# Patient Record
Sex: MALE | URBAN - METROPOLITAN AREA
[De-identification: names, ages, dates, MRNs, and addresses within clinical notes are randomized per-mention and may not be internally consistent; named-entity substitution may affect disease eponyms.]

---

## 2019-03-26 ENCOUNTER — OFFICE VISIT (OUTPATIENT)
Dept: FAMILY MEDICINE CLINIC | Age: 64
End: 2019-03-26

## 2019-03-26 ENCOUNTER — HOSPITAL ENCOUNTER (OUTPATIENT)
Dept: LAB | Age: 64
Discharge: HOME | End: 2019-03-26
Payer: MEDICAID

## 2019-03-26 VITALS
TEMPERATURE: 96.8 F | WEIGHT: 285 LBS | HEIGHT: 69 IN | SYSTOLIC BLOOD PRESSURE: 162 MMHG | OXYGEN SATURATION: 97 % | HEART RATE: 85 BPM | RESPIRATION RATE: 14 BRPM | BODY MASS INDEX: 42.21 KG/M2 | DIASTOLIC BLOOD PRESSURE: 76 MMHG

## 2019-03-26 DIAGNOSIS — L03.319 CELLULITIS AND ABSCESS OF TRUNK: ICD-10-CM

## 2019-03-26 DIAGNOSIS — Z11.59 NEED FOR HEPATITIS C SCREENING TEST: ICD-10-CM

## 2019-03-26 DIAGNOSIS — I10 ESSENTIAL HYPERTENSION WITH GOAL BLOOD PRESSURE LESS THAN 130/80: ICD-10-CM

## 2019-03-26 DIAGNOSIS — E11.8 TYPE 2 DIABETES MELLITUS WITH COMPLICATION, WITH LONG-TERM CURRENT USE OF INSULIN (HCC): ICD-10-CM

## 2019-03-26 DIAGNOSIS — Z79.4 TYPE 2 DIABETES MELLITUS WITH COMPLICATION, WITH LONG-TERM CURRENT USE OF INSULIN (HCC): Primary | ICD-10-CM

## 2019-03-26 DIAGNOSIS — L02.219 CELLULITIS AND ABSCESS OF TRUNK: ICD-10-CM

## 2019-03-26 DIAGNOSIS — Z79.4 TYPE 2 DIABETES MELLITUS WITH COMPLICATION, WITH LONG-TERM CURRENT USE OF INSULIN (HCC): ICD-10-CM

## 2019-03-26 DIAGNOSIS — E11.8 TYPE 2 DIABETES MELLITUS WITH COMPLICATION, WITH LONG-TERM CURRENT USE OF INSULIN (HCC): Primary | ICD-10-CM

## 2019-03-26 PROBLEM — E66.01 OBESITY, MORBID (HCC): Status: ACTIVE | Noted: 2019-03-26

## 2019-03-26 LAB
ALBUMIN SERPL-MCNC: 3.8 G/DL (ref 3.4–5)
ALBUMIN/GLOB SERPL: 1.1 {RATIO} (ref 0.8–1.7)
ALP SERPL-CCNC: 110 U/L (ref 45–117)
ALT SERPL-CCNC: 24 U/L (ref 16–61)
ANION GAP SERPL CALC-SCNC: 8 MMOL/L (ref 3–18)
AST SERPL-CCNC: 14 U/L (ref 15–37)
BASOPHILS # BLD: 0 K/UL (ref 0–0.1)
BASOPHILS NFR BLD: 0 % (ref 0–2)
BILIRUB SERPL-MCNC: 0.5 MG/DL (ref 0.2–1)
BUN SERPL-MCNC: 17 MG/DL (ref 7–18)
BUN/CREAT SERPL: 15 (ref 12–20)
CALCIUM SERPL-MCNC: 9 MG/DL (ref 8.5–10.1)
CHLORIDE SERPL-SCNC: 107 MMOL/L (ref 100–108)
CHOLEST SERPL-MCNC: 220 MG/DL
CO2 SERPL-SCNC: 27 MMOL/L (ref 21–32)
CREAT SERPL-MCNC: 1.15 MG/DL (ref 0.6–1.3)
DIFFERENTIAL METHOD BLD: ABNORMAL
EOSINOPHIL # BLD: 0.3 K/UL (ref 0–0.4)
EOSINOPHIL NFR BLD: 3 % (ref 0–5)
ERYTHROCYTE [DISTWIDTH] IN BLOOD BY AUTOMATED COUNT: 14.7 % (ref 11.6–14.5)
GLOBULIN SER CALC-MCNC: 3.5 G/DL (ref 2–4)
GLUCOSE SERPL-MCNC: 78 MG/DL (ref 74–99)
HBA1C MFR BLD HPLC: 9 %
HCT VFR BLD AUTO: 40.5 % (ref 36–48)
HDLC SERPL-MCNC: 42 MG/DL (ref 40–60)
HDLC SERPL: 5.2 {RATIO} (ref 0–5)
HGB BLD-MCNC: 12.3 G/DL (ref 13–16)
LDLC SERPL CALC-MCNC: 143.2 MG/DL (ref 0–100)
LIPID PROFILE,FLP: ABNORMAL
LYMPHOCYTES # BLD: 1.7 K/UL (ref 0.9–3.6)
LYMPHOCYTES NFR BLD: 16 % (ref 21–52)
MCH RBC QN AUTO: 27.8 PG (ref 24–34)
MCHC RBC AUTO-ENTMCNC: 30.4 G/DL (ref 31–37)
MCV RBC AUTO: 91.4 FL (ref 74–97)
MONOCYTES # BLD: 0.6 K/UL (ref 0.05–1.2)
MONOCYTES NFR BLD: 6 % (ref 3–10)
NEUTS SEG # BLD: 8.2 K/UL (ref 1.8–8)
NEUTS SEG NFR BLD: 75 % (ref 40–73)
PLATELET # BLD AUTO: 381 K/UL (ref 135–420)
PMV BLD AUTO: 10.8 FL (ref 9.2–11.8)
POTASSIUM SERPL-SCNC: 4.2 MMOL/L (ref 3.5–5.5)
PROT SERPL-MCNC: 7.3 G/DL (ref 6.4–8.2)
RBC # BLD AUTO: 4.43 M/UL (ref 4.7–5.5)
SODIUM SERPL-SCNC: 142 MMOL/L (ref 136–145)
TRIGL SERPL-MCNC: 174 MG/DL (ref ?–150)
VLDLC SERPL CALC-MCNC: 34.8 MG/DL
WBC # BLD AUTO: 10.9 K/UL (ref 4.6–13.2)

## 2019-03-26 PROCEDURE — 80053 COMPREHEN METABOLIC PANEL: CPT

## 2019-03-26 PROCEDURE — 86803 HEPATITIS C AB TEST: CPT

## 2019-03-26 PROCEDURE — 82043 UR ALBUMIN QUANTITATIVE: CPT

## 2019-03-26 PROCEDURE — 85025 COMPLETE CBC W/AUTO DIFF WBC: CPT

## 2019-03-26 PROCEDURE — 36415 COLL VENOUS BLD VENIPUNCTURE: CPT

## 2019-03-26 PROCEDURE — 80061 LIPID PANEL: CPT

## 2019-03-26 RX ORDER — LANCING DEVICE
1 EACH MISCELLANEOUS
COMMUNITY
Start: 2019-02-21

## 2019-03-26 RX ORDER — ACETAMINOPHEN 325 MG/1
650 TABLET ORAL
COMMUNITY
Start: 2019-02-21

## 2019-03-26 RX ORDER — LANCETS 33 GAUGE
EACH MISCELLANEOUS
Refills: 3 | COMMUNITY
Start: 2019-02-22 | End: 2019-10-22 | Stop reason: CLARIF

## 2019-03-26 RX ORDER — METFORMIN HYDROCHLORIDE 1000 MG/1
1000 TABLET ORAL 2 TIMES DAILY
Qty: 60 TAB | Refills: 3 | Status: SHIPPED | OUTPATIENT
Start: 2019-03-26 | End: 2019-07-01 | Stop reason: SDUPTHER

## 2019-03-26 RX ORDER — AMLODIPINE BESYLATE 10 MG/1
10 TABLET ORAL
COMMUNITY
Start: 2019-02-21 | End: 2019-03-26 | Stop reason: SDUPTHER

## 2019-03-26 RX ORDER — SULFAMETHOXAZOLE AND TRIMETHOPRIM 800; 160 MG/1; MG/1
TABLET ORAL
Refills: 0 | COMMUNITY
Start: 2019-02-22 | End: 2019-03-27

## 2019-03-26 RX ORDER — DOCUSATE SODIUM 100 MG/1
100 CAPSULE, LIQUID FILLED ORAL
COMMUNITY
Start: 2019-02-21 | End: 2019-07-01

## 2019-03-26 RX ORDER — AMLODIPINE BESYLATE 10 MG/1
10 TABLET ORAL DAILY
Qty: 90 TAB | Refills: 1 | Status: SHIPPED | OUTPATIENT
Start: 2019-03-26 | End: 2019-04-24 | Stop reason: ALTCHOICE

## 2019-03-26 RX ORDER — INSULIN PUMP SYRINGE, 3 ML
EACH MISCELLANEOUS
COMMUNITY
Start: 2019-02-22 | End: 2020-01-10 | Stop reason: SDUPTHER

## 2019-03-26 RX ORDER — OXYCODONE HYDROCHLORIDE 5 MG/1
5 TABLET ORAL
COMMUNITY
Start: 2019-02-21 | End: 2019-03-27

## 2019-03-26 RX ORDER — INSULIN HUMAN 100 [IU]/ML
INJECTION, SUSPENSION SUBCUTANEOUS
Refills: 2 | COMMUNITY
Start: 2019-03-19 | End: 2019-07-01 | Stop reason: SDUPTHER

## 2019-03-26 RX ORDER — METFORMIN HYDROCHLORIDE 1000 MG/1
1000 TABLET ORAL
COMMUNITY
Start: 2019-02-21 | End: 2019-03-26 | Stop reason: SDUPTHER

## 2019-03-26 NOTE — PATIENT INSTRUCTIONS
High Blood Pressure: Care Instructions  Overview    It's normal for blood pressure to go up and down throughout the day. But if it stays up, you have high blood pressure. Another name for high blood pressure is hypertension. Despite what a lot of people think, high blood pressure usually doesn't cause headaches or make you feel dizzy or lightheaded. It usually has no symptoms. But it does increase your risk of stroke, heart attack, and other problems. You and your doctor will talk about your risks of these problems based on your blood pressure. Your doctor will give you a goal for your blood pressure. Your goal will be based on your health and your age. Lifestyle changes, such as eating healthy and being active, are always important to help lower blood pressure. You might also take medicine to reach your blood pressure goal.  Follow-up care is a key part of your treatment and safety. Be sure to make and go to all appointments, and call your doctor if you are having problems. It's also a good idea to know your test results and keep a list of the medicines you take. How can you care for yourself at home? Medical treatment  · If you stop taking your medicine, your blood pressure will go back up. You may take one or more types of medicine to lower your blood pressure. Be safe with medicines. Take your medicine exactly as prescribed. Call your doctor if you think you are having a problem with your medicine. · Talk to your doctor before you start taking aspirin every day. Aspirin can help certain people lower their risk of a heart attack or stroke. But taking aspirin isn't right for everyone, because it can cause serious bleeding. · See your doctor regularly. You may need to see the doctor more often at first or until your blood pressure comes down. · If you are taking blood pressure medicine, talk to your doctor before you take decongestants or anti-inflammatory medicine, such as ibuprofen.  Some of these medicines can raise blood pressure. · Learn how to check your blood pressure at home. Lifestyle changes  · Stay at a healthy weight. This is especially important if you put on weight around the waist. Losing even 10 pounds can help you lower your blood pressure. · If your doctor recommends it, get more exercise. Walking is a good choice. Bit by bit, increase the amount you walk every day. Try for at least 30 minutes on most days of the week. You also may want to swim, bike, or do other activities. · Avoid or limit alcohol. Talk to your doctor about whether you can drink any alcohol. · Try to limit how much sodium you eat to less than 2,300 milligrams (mg) a day. Your doctor may ask you to try to eat less than 1,500 mg a day. · Eat plenty of fruits (such as bananas and oranges), vegetables, legumes, whole grains, and low-fat dairy products. · Lower the amount of saturated fat in your diet. Saturated fat is found in animal products such as milk, cheese, and meat. Limiting these foods may help you lose weight and also lower your risk for heart disease. · Do not smoke. Smoking increases your risk for heart attack and stroke. If you need help quitting, talk to your doctor about stop-smoking programs and medicines. These can increase your chances of quitting for good. When should you call for help? Call 911 anytime you think you may need emergency care. This may mean having symptoms that suggest that your blood pressure is causing a serious heart or blood vessel problem. Your blood pressure may be over 180/120.   For example, call 911 if:    · You have symptoms of a heart attack. These may include:  ? Chest pain or pressure, or a strange feeling in the chest.  ? Sweating. ? Shortness of breath. ? Nausea or vomiting. ? Pain, pressure, or a strange feeling in the back, neck, jaw, or upper belly or in one or both shoulders or arms. ? Lightheadedness or sudden weakness.   ? A fast or irregular heartbeat.     · You have symptoms of a stroke. These may include:  ? Sudden numbness, tingling, weakness, or loss of movement in your face, arm, or leg, especially on only one side of your body. ? Sudden vision changes. ? Sudden trouble speaking. ? Sudden confusion or trouble understanding simple statements. ? Sudden problems with walking or balance. ? A sudden, severe headache that is different from past headaches.     · You have severe back or belly pain.    Do not wait until your blood pressure comes down on its own. Get help right away.   Call your doctor now or seek immediate care if:    · Your blood pressure is much higher than normal (such as 180/120 or higher), but you don't have symptoms.     · You think high blood pressure is causing symptoms, such as:  ? Severe headache.  ? Blurry vision.    Watch closely for changes in your health, and be sure to contact your doctor if:    · Your blood pressure measures higher than your doctor recommends at least 2 times. That means the top number is higher or the bottom number is higher, or both.     · You think you may be having side effects from your blood pressure medicine. Where can you learn more? Go to http://elma-dillan.info/. Enter Q749 in the search box to learn more about \"High Blood Pressure: Care Instructions. \"  Current as of: July 22, 2018  Content Version: 11.9  © 3150-4661 [employer]. Care instructions adapted under license by [employer] (which disclaims liability or warranty for this information). If you have questions about a medical condition or this instruction, always ask your healthcare professional. [place] [address] any warranty or liability for your use of this information. DASH Diet: Care Instructions  Your Care Instructions    The DASH diet is an eating plan that can help lower your blood pressure. DASH stands for Dietary Approaches to Stop Hypertension.  Hypertension is high blood pressure. The DASH diet focuses on eating foods that are high in calcium, potassium, and magnesium. These nutrients can lower blood pressure. The foods that are highest in these nutrients are fruits, vegetables, low-fat dairy products, nuts, seeds, and legumes. But taking calcium, potassium, and magnesium supplements instead of eating foods that are high in those nutrients does not have the same effect. The DASH diet also includes whole grains, fish, and poultry. The DASH diet is one of several lifestyle changes your doctor may recommend to lower your high blood pressure. Your doctor may also want you to decrease the amount of sodium in your diet. Lowering sodium while following the DASH diet can lower blood pressure even further than just the DASH diet alone. Follow-up care is a key part of your treatment and safety. Be sure to make and go to all appointments, and call your doctor if you are having problems. It's also a good idea to know your test results and keep a list of the medicines you take. How can you care for yourself at home? Following the DASH diet  · Eat 4 to 5 servings of fruit each day. A serving is 1 medium-sized piece of fruit, ½ cup chopped or canned fruit, 1/4 cup dried fruit, or 4 ounces (½ cup) of fruit juice. Choose fruit more often than fruit juice. · Eat 4 to 5 servings of vegetables each day. A serving is 1 cup of lettuce or raw leafy vegetables, ½ cup of chopped or cooked vegetables, or 4 ounces (½ cup) of vegetable juice. Choose vegetables more often than vegetable juice. · Get 2 to 3 servings of low-fat and fat-free dairy each day. A serving is 8 ounces of milk, 1 cup of yogurt, or 1 ½ ounces of cheese. · Eat 6 to 8 servings of grains each day. A serving is 1 slice of bread, 1 ounce of dry cereal, or ½ cup of cooked rice, pasta, or cooked cereal. Try to choose whole-grain products as much as possible. · Limit lean meat, poultry, and fish to 2 servings each day.  A serving is 3 ounces, about the size of a deck of cards. · Eat 4 to 5 servings of nuts, seeds, and legumes (cooked dried beans, lentils, and split peas) each week. A serving is 1/3 cup of nuts, 2 tablespoons of seeds, or ½ cup of cooked beans or peas. · Limit fats and oils to 2 to 3 servings each day. A serving is 1 teaspoon of vegetable oil or 2 tablespoons of salad dressing. · Limit sweets and added sugars to 5 servings or less a week. A serving is 1 tablespoon jelly or jam, ½ cup sorbet, or 1 cup of lemonade. · Eat less than 2,300 milligrams (mg) of sodium a day. If you limit your sodium to 1,500 mg a day, you can lower your blood pressure even more. Tips for success  · Start small. Do not try to make dramatic changes to your diet all at once. You might feel that you are missing out on your favorite foods and then be more likely to not follow the plan. Make small changes, and stick with them. Once those changes become habit, add a few more changes. · Try some of the following:  ? Make it a goal to eat a fruit or vegetable at every meal and at snacks. This will make it easy to get the recommended amount of fruits and vegetables each day. ? Try yogurt topped with fruit and nuts for a snack or healthy dessert. ? Add lettuce, tomato, cucumber, and onion to sandwiches. ? Combine a ready-made pizza crust with low-fat mozzarella cheese and lots of vegetable toppings. Try using tomatoes, squash, spinach, broccoli, carrots, cauliflower, and onions. ? Have a variety of cut-up vegetables with a low-fat dip as an appetizer instead of chips and dip. ? Sprinkle sunflower seeds or chopped almonds over salads. Or try adding chopped walnuts or almonds to cooked vegetables. ? Try some vegetarian meals using beans and peas. Add garbanzo or kidney beans to salads. Make burritos and tacos with mashed yeager beans or black beans. Where can you learn more? Go to http://wills-dillan.info/.   Enter Q519 in the search box to learn more about \"DASH Diet: Care Instructions. \"  Current as of: July 22, 2018  Content Version: 11.9  © 0071-9428 [employer]. Care instructions adapted under license by [employer] (which disclaims liability or warranty for this information). If you have questions about a medical condition or this instruction, always ask your healthcare professional. [address] any warranty or liability for your use of this information.

## 2019-03-26 NOTE — PROGRESS NOTES
[facility]    CC: EOC    HPI:     DMII:  -New diagnosis  -Diagnosed in February  -Has already received dietary counseling  -Does not log blood sugar  -Reports that FBS has been in 150's  -Taking diabetic medication as prescribed  -Denies any side effects or issues with medication  -Not following a regular exercise regimen      HTN:  -Not taking any BP medication  -Was on amlodipine  -Trying to follow low sodium diet  -Does not check BP at home      Abscess:  -Does not know for sure when it started as it was not bothering him  -Located on left side of lower back  -Associated with cellulitis  -Had an I&D by Dr. Paul Herrera with surgery on 2/14/2019 and was started on a 6 day Bactrim DS regimen  -Wound vacuum was placed  -Has followed up with Dr. Paul Herrera on 2/28/2019 and 3/14/2019  -Completed the antibiotic regimen as prescribed  -Next appointment with Dr. Paul Herrera is in two days  -Reports that infection has been improving      ROS: Positive items marked in RED  CON: fever, chills  Cardiovascular: palpitations, CP  Resp: SOB, cough  GI: nausea, vomiting, diarrhea  : dysuria, hematuria  SKIN: rash, itching  MS: arthralgias, myalgias  Neuro: numbness (At night in LEs), weakness  Psych: depression, anxiety  Endo: polyuria (When standing), polydipsia      Past Medical History:   Diagnosis Date    [facility] [address]     Hypertension     Morbid obesity ([facility] [address]        Past Surgical History:   Procedure Laterality Date    HX CYST INCISION AND DRAINAGE  02/14/2019    Abscess       Family History   Problem Relation Age of Onset    Diabetes Mother     Kidney Disease Father        Social History     Socioeconomic History    Marital status: UNKNOWN     Spouse name: Not on file    Number of children: Not on file    Years of education: Not on file    Highest education level: Not on file   Tobacco Use    Smoking status: Never Smoker    Smokeless tobacco: Never Used   Substance and Sexual Activity    Alcohol use: Not Currently    Drug use: Never    Sexual activity: Not Currently       No Known Allergies      Current Outpatient Medications:     acetaminophen (TYLENOL) 325 mg tablet, Take 650 mg by mouth., Disp: , Rfl:     ONETOUCH ULTRA BLUE TEST STRIP strip, TEST BLOOD SUGAR ONCE D, Disp: , Rfl: 3    Blood-Glucose Meter (ONETOUCH ULTRA2) monitoring kit, CHECK BLOOD SUGAR, Disp: , Rfl:     docusate sodium (COLACE) 100 mg capsule, Take 100 mg by mouth., Disp: , Rfl:     HUMULIN 70/30 U-100 INSULIN 100 unit/mL (70-30) injection, INJECT 20 UNITS BENEATH THE SKIN BID, Disp: , Rfl: 2    ONE TOUCH DELICA 33 gauge misc, CHECK BLOOD SUGAR ONCE D, Disp: , Rfl: 3    Lancing Device misc, 1 Each., Disp: , Rfl:     amLODIPine (NORVASC) 10 mg tablet, Take 1 Tab by mouth daily. , Disp: 90 Tab, Rfl: 1    metFORMIN (GLUCOPHAGE) 1,000 mg tablet, Take 1 Tab by mouth two (2) times a day., Disp: 60 Tab, Rfl: 3    Physical Exam:      /76   Pulse 85   Temp 96.8 °F (36 °C) (Oral)   Resp 14   Ht 5' 9\" (1.753 m)   Wt 285 lb (129.3 kg)   SpO2 97%   BMI 42.09 kg/m²     General: obese habitus, NAD, conversant  Eyes: sclera clear bilaterally, no discharge noted, eyelids normal in appearance, EOMI, PERRLA  HENT: NCAT, oropharynx clear, MMM  Neck: supple, no lymphadenopathy  Lungs: CTAB, normal respiratory effort and rate  CV: RRR, no MRGs  ABD: soft, non-tender, non-distended, normal bowel sounds  Ext: 2+ pitting edema noted in LEs bilaterally, no digital cyanosis noted  Skin: Wound vac in place in left side of lower back. No surrounding erythema or calor noned. Stasis dermatitis noted of LEs bilaterally. normal temperature. Psych: alert and oriented to person, place and situation, normal affect  Neuro: speech normal, moving all extremities, gait normal, 5/5 strength in upper/lower extremities bilaterally, sensation and CN II-XII grossly intact    Results for Brianna Rodriguez (MRN <[id number]>):   Ref.  Range 3/26/2019 12:51   Hemoglobin A1c (POC) Latest Units: % 9.0       CBC WITH DIFFERENTIAL AUTO (02/21/2019 6:20 AM EST)   Component Value Ref Range Performed At Pathologist Signature   WBC x 10*3 13.4 (H) 4.0 - 11.0 K/uL [facility]     RBC x 10^6 4.27 3.80 - 5.80 M/uL [facility]     HGB 12.0 (L) 13.1 - 17.2 g/dL [facility]     HCT 37.4 (L) 39.3 - 51.6 % [facility]     MCV 88 80 - 95 fL [facility]     MCH 28 26 - 34 pg [facility]     MCHC 32 31 - 36 g/dL [facility]     RDW 13.0 10.0 - 15.5 % [facility]     Platelet 096 (H) 068 - 440 K/uL [facility]     MPV 9.5 9.0 - 13.0 fL [facility]     Segmented Neutrophils 86 (H) 40 - 75 % [facility]     Lymphocytes 11 (L) 20 - 45 % [facility]     Monocytes 5 3 - 12 % [facility]     Eosinophil 2 0 - 6 % [facility]     Basophils 0 0 - 2 % [facility]     Absolute Neutrophils 11.5 (H) 1.8 - 7.7 K/uL [facility]     Absolute Lymphocytes 1.4 1.0 - 4.8 K/uL [facility]     Absolute Monocyte Count 0.7 0.1 - 1.0 K/uL [facility]     Absolute Eosinophil 0.3 0.0 - 0.5 K/uL [facility]     Absolute Basophil Count 0.0 0.0 - 0.2 K/uL [facility]        Basic Metabolic Panel (BMP) tomorrow am (02/21/2019 6:20 AM EST)   Component Value Ref Range Performed At Pathologist Signature   Potassium 4.4 3.5 - 5.5 mmol/L [facility]     Sodium 140 133 - 145 mmol/L [facility]     Chloride 103 98 - 110 mmol/L [facility]     Glucose 157 (H) 70 - 99 mg/dL [facility]     Calcium 8.8 8.4 - 10.4 mg/dL [facility]     BUN 13 6 - 22 mg/dL [facility]     Creatinine 1.3 0.8 - 1.6 mg/dL [facility]     CO2 25 20 - 32 mmol/L [facility]   eGFR  >60.0 >60.0 [facility]     eGFR Non  53.8 (L) >60.0 [facility]     Anion Gap 12.0 mmol/L [facility]        HGB A1C WITH EST AVG GLUCOSE (02/15/2019 3:58 AM EST)   Component Value Ref Range Performed At Pathologist Signature   Hemoglobin A1c 10.7 (H) 4.8 - 5.9 % [facility]     Estimated Average Glucose 260 (H) 91 - 123 mg/dL [facility]        Assessment/Plan     HTN, Inadequately Controlled:  -Not at goal BP of <130/80  -Counseled on BP goal and recommended lifestyle changes  -Will resume on prior amlodipine regimen  -CBC, CMP, fasting lipid panel, and urine microalbumin/creatinine ordered  -Handouts given on HTN care and DASH diet  -F/U in one month      DMII, Improving:  -HGBA1c has gone down by ~1% (Suspect improvement has been more, as it has not been 3 months since it was last checked)  -Counseled on FBS goal  -Advised to log blood sugar and to bring log to next appointment  -Will continue current diabetic medication regimen  -CBC, CMP, fasting lipid panel, and urine microalbumin/creatinine ordered  -F/U in one month      Abscess with Cellulitis, Resolving:  -No signs of active infection noted  -CBC ordered  -Will defer treatment to surgery  -Follow up as needed      Health Maintenance:  -Hepatitis C AB ordered        Lee Gutiérrez MD  3/26/2019, 12:54 PM

## 2019-03-26 NOTE — PROGRESS NOTES
Chief Complaint   Patient presents with   [address] [place] [address]     1. Have you been to the ER, urgent care clinic since your last visit? Hospitalized since your last visit? No    2. Have you seen or consulted any other health care providers outside of the [address] [place] [address] since your last visit? Yes had abscess removed from his back. 3 most recent PHQ Screens 3/26/2019   Little interest or pleasure in doing things Not at all   Feeling down, depressed, irritable, or hopeless Not at all   Total Score PHQ 2 0     Health Maintenance:  · Patient stated that he has never had a colon cancer screening. · Script needed for Shingrix vaccine. · Hep C screening is due.     Visit Vitals  /76   Pulse 85   Temp 96.8 °F (36 °C) (Oral)   Resp 14   Ht 5' 9\" (1.753 m)   Wt 285 lb (129.3 kg)   SpO2 97%   BMI 42.09 kg/m²

## 2019-03-27 LAB
CREAT UR-MCNC: 84 MG/DL (ref 30–125)
HCV AB SER IA-ACNC: <0.02 INDEX
HCV AB SERPL QL IA: NEGATIVE
HCV COMMENT,HCGAC: NORMAL
MICROALBUMIN UR-MCNC: 4.69 MG/DL (ref 0–3)
MICROALBUMIN/CREAT UR-RTO: 56 MG/G (ref 0–30)

## 2019-04-24 ENCOUNTER — OFFICE VISIT (OUTPATIENT)
Dept: FAMILY MEDICINE CLINIC | Age: 64
End: 2019-04-24

## 2019-04-24 VITALS
WEIGHT: 279 LBS | SYSTOLIC BLOOD PRESSURE: 138 MMHG | BODY MASS INDEX: 41.32 KG/M2 | RESPIRATION RATE: 14 BRPM | HEART RATE: 85 BPM | TEMPERATURE: 96.7 F | OXYGEN SATURATION: 98 % | HEIGHT: 69 IN | DIASTOLIC BLOOD PRESSURE: 59 MMHG

## 2019-04-24 DIAGNOSIS — I10 ESSENTIAL HYPERTENSION WITH GOAL BLOOD PRESSURE LESS THAN 130/80: ICD-10-CM

## 2019-04-24 DIAGNOSIS — R80.9 MICROALBUMINURIA: ICD-10-CM

## 2019-04-24 DIAGNOSIS — Z79.4 TYPE 2 DIABETES MELLITUS WITH COMPLICATION, WITH LONG-TERM CURRENT USE OF INSULIN (HCC): Primary | ICD-10-CM

## 2019-04-24 DIAGNOSIS — E11.8 TYPE 2 DIABETES MELLITUS WITH COMPLICATION, WITH LONG-TERM CURRENT USE OF INSULIN (HCC): Primary | ICD-10-CM

## 2019-04-24 DIAGNOSIS — E78.5 HYPERLIPIDEMIA, UNSPECIFIED HYPERLIPIDEMIA TYPE: ICD-10-CM

## 2019-04-24 RX ORDER — AMLODIPINE BESYLATE AND ATORVASTATIN CALCIUM 10; 40 MG/1; MG/1
1 TABLET, FILM COATED ORAL DAILY
Qty: 30 TAB | Refills: 6 | Status: SHIPPED | OUTPATIENT
Start: 2019-04-24 | End: 2019-07-01 | Stop reason: ALTCHOICE

## 2019-04-24 RX ORDER — LISINOPRIL 10 MG/1
10 TABLET ORAL DAILY
Qty: 30 TAB | Refills: 4 | Status: SHIPPED | OUTPATIENT
Start: 2019-04-24 | End: 2019-07-01 | Stop reason: SDUPTHER

## 2019-04-24 NOTE — PROGRESS NOTES
Chief Complaint   Patient presents with    Follow-up     1 month follow up on HTN and DMII and review of lab results. 1. Have you been to the ER, urgent care clinic since your last visit? Hospitalized since your last visit? No.    2. Have you seen or consulted any other health care providers outside of the [address] [place] [address] since your last visit?  No.      Visit Vitals  /59   Pulse 85   Temp 96.7 °F (35.9 °C) (Oral)   Resp 14   Ht 5' 9\" (1.753 m)   Wt 279 lb (126.6 kg)   SpO2 98%   BMI 41.20 kg/m²

## 2019-04-24 NOTE — PROGRESS NOTES
Kim Tidwell    CC: F/U for chronic disease management    HPI:     DMII:  -Got requested lab work  -Taking medication as prescribed  -Denies any side effects or issues with his medication  -Has been logging blood sugar at home, but forgot to bring log to visit  -Thinks his FBS average has been ~180  -Not exercising, but has been doing some yard work  -Trying to watch what he is eating      HTN:  -Got requested lab work  -Taking BP medication as prescribed  -Denies any side effects or issues with his medication  -Trying to watch what he is eating  -Not following a regular exercise regimen      HLD:  -Got requested lab work  -On no cholesterol lowering medication  -Trying to watch what he is eating  -Not following a regular exercise regimen      ROS: Positive items marked in RED  CON: fever, chills  Cardiovascular: palpitations, CP  Resp: SOB, cough  GI: nausea, vomiting, diarrhea  : dysuria, hematuria      Past Medical History:   Diagnosis Date    [facility]II [facility]di[facility] mellitus, type 2) [facility] [address]     Hypertension     Morbid obesity ([facility] [address]        Past Surgical History:   Procedure Laterality Date    HX CYST INCISION AND DRAINAGE  02/14/2019    Abscess       Family History   Problem Relation Age of Onset    Diabetes Mother     Kidney Disease Father        Social History     Socioeconomic History    Marital status: UNKNOWN     Spouse name: Not on file    Number of children: Not on file    Years of education: Not on file    Highest education level: Not on file   Tobacco Use    Smoking status: Never Smoker    Smokeless tobacco: Never Used   Substance and Sexual Activity    Alcohol use: Not Currently    Drug use: Never    Sexual activity: Not Currently       No Known Allergies      Current Outpatient Medications:     acetaminophen (TYLENOL) 325 mg tablet, Take 650 mg by mouth., Disp: , Rfl:     ONETOUCH ULTRA BLUE TEST STRIP strip, TEST BLOOD SUGAR ONCE D, Disp: , Rfl: 3    Blood-Glucose Meter (ONETOUCH ULTRA2) monitoring kit, CHECK BLOOD SUGAR, Disp: , Rfl:     docusate sodium (COLACE) 100 mg capsule, Take 100 mg by mouth., Disp: , Rfl:     HUMULIN 70/30 U-100 INSULIN 100 unit/mL (70-30) injection, INJECT 20 UNITS BENEATH THE SKIN BID, Disp: , Rfl: 2    ONE TOUCH DELICA 33 gauge misc, CHECK BLOOD SUGAR ONCE D, Disp: , Rfl: 3    Lancing Device misc, 1 Each., Disp: , Rfl:     amLODIPine (NORVASC) 10 mg tablet, Take 1 Tab by mouth daily. , Disp: 90 Tab, Rfl: 1    metFORMIN (GLUCOPHAGE) 1,000 mg tablet, Take 1 Tab by mouth two (2) times a day., Disp: 60 Tab, Rfl: 3    acyclovir (ZOVIRAX) 800 mg tablet, Take 800 mg by mouth two (2) times a day., Disp: , Rfl:     white petrolatum-mineral oil (LACRILUBE S.O.P.) 56.8-42.5 % ointment, Administer  to both eyes every twelve (12) hours. , Disp: , Rfl:     predniSONE (DELTASONE) 10 mg tablet, Take  by mouth daily (with breakfast). , Disp: , Rfl:     Physical Exam:      /59   Pulse 85   Temp 96.7 °F (35.9 °C) (Oral)   Resp 14   Ht 5' 9\" (1.753 m)   Wt 279 lb (126.6 kg)   SpO2 98%   BMI 41.20 kg/m²     General: obese habitus, NAD, conversant  Eyes: sclera clear bilaterally, no discharge noted, eyelids normal in appearance  HENT: NCAT  Lungs: CTAB, normal respiratory effort and rate  CV: RRR, no MRGs  ABD: soft, non-tender, non-distended, normal bowel sounds  Skin: normal temperature, turgor, color, and texture  Psych: alert and oriented to person, place and situation, normal affect  Neuro: speech normal, moving all extremities, gait normal      Results for Ruddy Boxer (MRN [id number]):   Ref.  Range 3/26/2019 13:34   WBC Latest Ref Range: 4.6 - 13.2 K/uL 10.9   RBC Latest Ref Range: 4.70 - 5.50 M/uL 4.43 (L)   HGB Latest Ref Range: 13.0 - 16.0 g/dL 12.3 (L)   HCT Latest Ref Range: 36.0 - 48.0 % 40.5   MCV Latest Ref Range: 74.0 - 97.0 FL 91.4   MCH Latest Ref Range: 24.0 - 34.0 PG 27.8   MCHC Latest Ref Range: 31.0 - 37.0 g/dL 30.4 (L)   RDW Latest Ref Range: 11.6 - 14.5 % 14.7 (H)   PLATELET Latest Ref Range: 135 - 420 K/uL 381   MPV Latest Ref Range: 9.2 - 11.8 FL 10.8   NEUTROPHILS Latest Ref Range: 40 - 73 % 75 (H)   LYMPHOCYTES Latest Ref Range: 21 - 52 % 16 (L)   MONOCYTES Latest Ref Range: 3 - 10 % 6   EOSINOPHILS Latest Ref Range: 0 - 5 % 3   BASOPHILS Latest Ref Range: 0 - 2 % 0   DF Latest Units:   AUTOMATED   ABS. NEUTROPHILS Latest Ref Range: 1.8 - 8.0 K/UL 8.2 (H)   ABS. LYMPHOCYTES Latest Ref Range: 0.9 - 3.6 K/UL 1.7   ABS. MONOCYTES Latest Ref Range: 0.05 - 1.2 K/UL 0.6   ABS. EOSINOPHILS Latest Ref Range: 0.0 - 0.4 K/UL 0.3   ABS. BASOPHILS Latest Ref Range: 0.0 - 0.1 K/UL 0.0   Sodium Latest Ref Range: 136 - 145 mmol/L 142   Potassium Latest Ref Range: 3.5 - 5.5 mmol/L 4.2   Chloride Latest Ref Range: 100 - 108 mmol/L 107   CO2 Latest Ref Range: 21 - 32 mmol/L 27   Anion gap Latest Ref Range: 3.0 - 18 mmol/L 8   Glucose Latest Ref Range: 74 - 99 mg/dL 78   BUN Latest Ref Range: 7.0 - 18 MG/DL 17   Creatinine Latest Ref Range: 0.6 - 1.3 MG/DL 1.15   BUN/Creatinine ratio Latest Ref Range: 12 - 20   15   Calcium Latest Ref Range: 8.5 - 10.1 MG/DL 9.0   GFR est non-AA Latest Ref Range: >60 ml/min/1.73m2 >60   GFR est AA Latest Ref Range: >60 ml/min/1.73m2 >60   Bilirubin, total Latest Ref Range: 0.2 - 1.0 MG/DL 0.5   Protein, total Latest Ref Range: 6.4 - 8.2 g/dL 7.3   Albumin Latest Ref Range: 3.4 - 5.0 g/dL 3.8   Globulin Latest Ref Range: 2.0 - 4.0 g/dL 3.5   A-G Ratio Latest Ref Range: 0.8 - 1.7   1.1   ALT (SGPT) Latest Ref Range: 16 - 61 U/L 24   AST Latest Ref Range: 15 - 37 U/L 14 (L)   Alk.  phosphatase Latest Ref Range: 45 - 117 U/L 110   Triglyceride Latest Ref Range: <150 MG/ (H)   Cholesterol, total Latest Ref Range: <200 MG/ (H)   HDL Cholesterol Latest Ref Range: 40 - 60 MG/DL 42   CHOL/HDL Ratio Latest Ref Range: 0 - 5.0   5.2 (H)   LDL, calculated Latest Ref Range: 0 - 100 MG/.2 (H)   VLDL, calculated Latest Units: MG/DL 34.8   Creatinine, urine Latest Ref Range: 30 - 125 mg/dL 84.00   Microalbumin,urine random Latest Ref Range: 0 - 3.0 MG/DL 4.69 (H)   Microalbumin/Creat. Ratio Latest Ref Range: 0 - 30 mg/g 56 (H)   Hepatitis C virus Ab Latest Ref Range: <0.80 Index <0.02   Hep C  virus Ab Interp.  Latest Ref Range: NEG   NEGATIVE   Hep C  virus Ab comment Latest Units:   Pend       Assessment/Plan     DMII:  -Advised to bring blood sugar log to next appointment as previously discussed  -Will continue current diabetic medication regimen  -Plan for HGBA1c, diabetic foot exam, and urine microalbumin/creatinine at next visit  -Follow up in 2 months      HTN, Inadequately Controlled:  -10 year ASCVD risk of 30.6%  -Not at goal BP of <130/80  -Will start on lisinopril regimen  -Plan for repeat urine microalbumin/creatinine at next visit  -Follow up in 2 months      HLD, Inadequately Controlled:  -10 year ASCVD risk of 30.6%  -Counseled on ASCVD risk and treatment recommendations  -Will start on statin therapy, per shared decision making  -Handout given on HLD care  -F/U in 2 months      Microalbinuria:  -Patient counseled on diagnosis  -Started on lisinopril regimen  -Plan to repeat urine microalbumin/creatinine at next visit  -Handout given on albumin urine test and albumin-creatinine ratio  -F/U in 2 months        Singh Moreno MD  4/24/2019, 2:31 PM

## 2019-04-24 NOTE — PATIENT INSTRUCTIONS
Albumin Urine Test: About This Test  What is it? An albumin urine test checks urine for a protein called albumin. This protein is normally found in the blood. When the kidneys are damaged, small amounts of albumin leak into the urine. This is called albuminuria. If the amount of albumin is very small, but still abnormal, it is called microalbuminuria. You might provide a urine sample for your doctor during a visit. Your doctor might also ask you for a one-time sample at home or over a specific period of time, such as over 4 hours or 24 hours. Your doctor will tell you what to do. Why is this test done? This test is done to check for albumin in the urine. It helps tell your doctor how well your kidneys are working. This test is done most often to check the kidneys in people with diabetes. Other conditions also cause albuminuria. These conditions include high blood pressure, heart failure, and cirrhosis. The sooner your doctor knows you have kidney damage, the more your doctor can do to protect your kidneys. How can you prepare for the test?  · Do not exercise just before the test.  · Tell your doctor if you are having your period or have vaginal discharge. · Tell your doctor about all the nonprescription and prescription medicines and herbs or other supplements you take. Some of these can affect the results of this test.  What happens before the test?  · Your doctor or the lab likely will give you the container you need to hold the urine. You will get instructions on when and how to collect the urine. This might be a one-time sample or a number of samples over a period of time. What happens during the test?  One-time urine collection  · Wash your hands before you start. · If the collection cup you are given has a lid, remove it carefully. Set it down with the inner surface up. Do not touch the inside of the cup with your fingers. · Clean the area around your genitals.   ? For men: Pull back the foreskin, if present, and clean the head of the penis with medicated towelettes or swabs. ? For women: Spread open the genital folds of skin with one hand. Then use medicated towelettes or swabs in your other hand to clean the area where urine comes out (the urethra). Wipe the area from front to back. · Start urinating into the toilet or urinal. A woman should hold apart the genital folds of skin while she urinates. · After the urine has flowed for several seconds, place the cup into the urine stream. Collect about 2 fluid ounces of this \"midstream\" urine without stopping your flow of urine. · Don't touch the rim of the cup to your genital area. Don't get toilet paper, pubic hair, stool (feces), menstrual blood, or anything else in the urine sample. · Finish urinating into the toilet or urinal.  · Carefully replace and tighten the lid on the cup, and then return it to the lab. If you are collecting the urine at home and can't get it to the lab in an hour, refrigerate it. Urine collection over time  You collect your urine for a period of time, such as over 4 or 24 hours. · You start collecting your urine in the morning. When you first get up, empty your bladder. But do not save this urine. Write down the time that you began. · For the set period of time, collect all your urine. Urinate into a small, clean container. Then pour the urine into the large container. Don't touch the inside of the container with your fingers. · Keep the collected urine in the refrigerator for the collection time. Empty your bladder for the last time at or just before the end of the collection period. Add this urine to the large container. Then write down the time. What else should you know about the test?  · If your results are higher than normal, your doctor may check your urine more often to watch for kidney damage. · If your test shows that you may have kidney damage, you may get other tests.   What happens after the test?  · Follow your doctor's instructions for taking the urine to the doctor's office or lab. · You can go back to your usual activities right away. When should you call for help? Watch closely for changes in your health, and be sure to contact your doctor if you have any problems. Follow-up care is a key part of your treatment and safety. Be sure to make and go to all appointments, and call your doctor if you are having problems. It's also a good idea to keep a list of the medicines you take. Ask your doctor when you can expect to have your test results. Where can you learn more? Go to http://elma-dillan.info/. Enter L729 in the search box to learn more about \"Albumin Urine Test: About This Test.\"  Current as of: March 14, 2018  Content Version: 11.9  © 5038-7944 [employer]. Care instructions adapted under license by [employer] (which disclaims liability or warranty for this information). If you have questions about a medical condition or this instruction, always ask your healthcare professional. [address] any warranty or liability for your use of this information. Albumin-Creatinine Ratio: About This Test  What is it? An albumin-creatinine ratio test compares the amounts of albumin and creatinine in your urine. Albumin (say \"al-BYOO-mio\") is normally found in the blood. When the kidneys are damaged, small amounts of albumin (microalbumin) leak into the urine. Creatinine (say \"ekhv-YX-ff-neen\") is a waste product found in urine. Why is this test done? This test helps your doctor see how well your kidneys are working. It is done most often to check the kidneys in people with diabetes. It may also be done to check people with high blood pressure, heart failure, and cirrhosis.   How can you prepare for the test?    · Do not exercise just before the test.     · Tell your doctor if you are having your period or have vaginal discharge. · Tell your doctors ALL the medicines, vitamins, supplements, and herbal remedies you take. Some of these can affect the results of this test.   What happens before the test?  · Your doctor or the lab will probably give you the container you need to hold the urine and give you instructions on when and how to collect the urine. This might be a one-time collection or a collection over a period of time. What happens during the test?  One-time urine collection  · Wash your hands. · If the collection cup has a lid, remove it carefully and set it down with the inner surface up. Don't touch the inside of the cup. · Clean the area around your genitals. ? For men: Retract the foreskin, if present, and clean the head of your penis with medicated towelettes or swabs. ? For women: Spread open the genital folds of skin with one hand. Then use your other hand to clean the area around the urethra with medicated towelettes or swabs. Wipe the area from front to back so bacteria from the anus are not wiped across the urethra. · Begin urinating into the toilet or urinal. If you are a woman, hold apart the genital folds of skin while you urinate. · After the urine has flowed for several seconds, place the collection cup into the urine stream and collect about 2 fluid ounces of this \"midstream\" urine without stopping your flow of urine. · Don't touch the rim of the cup to your genital area. Don't get toilet paper, pubic hair, stool (feces), menstrual blood, or anything else in the urine sample. · Finish urinating into the toilet or urinal.  · Carefully replace and tighten the lid on the cup, and then return it to the lab. If you are collecting the urine at home and can't get it to the lab in an hour, refrigerate it. Urine collection over time  You collect your urine for a period of time, such as over 4 or 24 hours. · Start collecting your urine in the morning.  When you first get up, empty your bladder, but don't save this urine. Write down the time that you urinated to kyree the beginning of your collection period. · For the set period of time, collect all your urine. Urinate into a small, clean container as noted in the instructions above, and then pour the urine into the large container. Don't touch the inside of this container with your fingers. Keep the large container in the refrigerator. · Empty your bladder for the final time at or just before the end of the collection period. Add this urine to the large container, and write down the time. What else should you know about the test?  · Your results will include an explanation of what a \"normal\" result is. This is called a \"reference range. \" It is just a guide. Your doctor will evaluate your results based on your health and other factors. This means that a value that falls outside the normal values listed may still be normal for you. · If your results are higher than normal, your doctor may check your urine more often to watch for kidney damage. · If your test shows that you may have kidney damage, other tests may be done. What happens after the test?  · Follow your doctor's instructions for taking the urine to the doctor's office or lab. · You can go back to your usual activities right away. Follow-up care is a key part of your treatment and safety. Be sure to make and go to all appointments, and call your doctor if you are having problems. It's also a good idea to keep a list of the medicines you take. Ask your doctor when you can expect to have your test results. Where can you learn more? Go to http://elma-dillan.info/. Enter N746 in the search box to learn more about \"Albumin-Creatinine Ratio: About This Test.\"  Current as of: March 14, 2018  Content Version: 11.9  © 9147-4162 [employer]. Care instructions adapted under license by [employer] (which disclaims liability or warranty for this information).  If you have questions about a medical condition or this instruction, always ask your healthcare professional. [address] any warranty or liability for your use of this information. Learning About High Cholesterol  What is high cholesterol? Cholesterol is a type of fat in your blood. It is needed for many body functions, such as making new cells. Cholesterol is made by your body. It also comes from food you eat. If you have too much cholesterol, it starts to build up in your arteries. This is called hardening of the arteries, or atherosclerosis. High cholesterol raises your risk of a heart attack and stroke. There are different types of cholesterol. LDL is the \"bad\" cholesterol. High LDL can raise your risk for heart disease, heart attack, and stroke. HDL is the \"good\" cholesterol. High HDL is linked with a lower risk for heart disease, heart attack, and stroke. Your cholesterol levels help your doctor find out your risk for having a heart attack or stroke. How can you prevent high cholesterol? A heart-healthy lifestyle can help you prevent high cholesterol. This lifestyle helps lower your risk for a heart attack and stroke. · Eat heart-healthy foods. ? Eat fruits, vegetables, whole grains (like oatmeal), dried beans and peas, nuts and seeds, soy products (like tofu), and fat-free or low-fat dairy products. ? Replace butter, margarine, and hydrogenated or partially hydrogenated oils with olive and canola oils. (Canola oil margarine without trans fat is fine.)  ? Replace red meat with fish, poultry, and soy protein (like tofu). ? Limit processed and packaged foods like chips, crackers, and cookies. · Be active. Exercise can improve your cholesterol level. Get at least 30 minutes of exercise on most days of the week. Walking is a good choice. You also may want to do other activities, such as running, swimming, cycling, or playing tennis or team sports. · Stay at a healthy weight. Lose weight if you need to. · Don't smoke. If you need help quitting, talk to your doctor about stop-smoking programs and medicines. These can increase your chances of quitting for good. How is high cholesterol treated? The goal of treatment is to reduce your chances of having a heart attack or stroke. The goal is not to lower your cholesterol numbers only. · You may make lifestyle changes, such as eating healthy foods, not smoking, losing weight, and being more active. · You may have to take medicine. Follow-up care is a key part of your treatment and safety. Be sure to make and go to all appointments, and call your doctor if you are having problems. It's also a good idea to know your test results and keep a list of the medicines you take. Where can you learn more? Go to http://elma-dillan.info/. Enter O657 in the search box to learn more about \"Learning About High Cholesterol. \"  Current as of: July 22, 2018  Content Version: 11.9  © 7104-9890 [employer]. Care instructions adapted under license by [employer] (which disclaims liability or warranty for this information). If you have questions about a medical condition or this instruction, always ask your healthcare professional. [address] any warranty or liability for your use of this information.

## 2019-07-01 ENCOUNTER — OFFICE VISIT (OUTPATIENT)
Dept: FAMILY MEDICINE CLINIC | Age: 64
End: 2019-07-01

## 2019-07-01 VITALS
BODY MASS INDEX: 42.06 KG/M2 | TEMPERATURE: 97.9 F | RESPIRATION RATE: 17 BRPM | WEIGHT: 284 LBS | HEART RATE: 79 BPM | SYSTOLIC BLOOD PRESSURE: 171 MMHG | OXYGEN SATURATION: 95 % | DIASTOLIC BLOOD PRESSURE: 88 MMHG | HEIGHT: 69 IN

## 2019-07-01 DIAGNOSIS — E11.8 TYPE 2 DIABETES MELLITUS WITH COMPLICATION, WITH LONG-TERM CURRENT USE OF INSULIN (HCC): Primary | ICD-10-CM

## 2019-07-01 DIAGNOSIS — E78.5 HYPERLIPIDEMIA, UNSPECIFIED HYPERLIPIDEMIA TYPE: ICD-10-CM

## 2019-07-01 DIAGNOSIS — R80.9 MICROALBUMINURIA: ICD-10-CM

## 2019-07-01 DIAGNOSIS — I10 ESSENTIAL HYPERTENSION WITH GOAL BLOOD PRESSURE LESS THAN 130/80: ICD-10-CM

## 2019-07-01 DIAGNOSIS — Z79.4 TYPE 2 DIABETES MELLITUS WITH COMPLICATION, WITH LONG-TERM CURRENT USE OF INSULIN (HCC): Primary | ICD-10-CM

## 2019-07-01 LAB — HBA1C MFR BLD HPLC: 8.9 %

## 2019-07-01 RX ORDER — LISINOPRIL 10 MG/1
10 TABLET ORAL DAILY
Qty: 30 TAB | Refills: 4 | Status: SHIPPED | OUTPATIENT
Start: 2019-07-01 | End: 2019-09-18 | Stop reason: DRUGHIGH

## 2019-07-01 RX ORDER — INSULIN HUMAN 100 [IU]/ML
INJECTION, SUSPENSION SUBCUTANEOUS
Qty: 10 ML | Refills: 2 | Status: SHIPPED | OUTPATIENT
Start: 2019-07-01 | End: 2019-10-22 | Stop reason: SDUPTHER

## 2019-07-01 RX ORDER — AMLODIPINE BESYLATE 10 MG/1
10 TABLET ORAL DAILY
Qty: 90 TAB | Refills: 1 | Status: SHIPPED | OUTPATIENT
Start: 2019-07-01 | End: 2020-03-06

## 2019-07-01 RX ORDER — ATORVASTATIN CALCIUM 40 MG/1
40 TABLET, FILM COATED ORAL DAILY
Qty: 90 TAB | Refills: 1 | Status: SHIPPED | OUTPATIENT
Start: 2019-07-01 | End: 2020-03-06

## 2019-07-01 RX ORDER — METFORMIN HYDROCHLORIDE 1000 MG/1
1000 TABLET ORAL 2 TIMES DAILY
Qty: 180 TAB | Refills: 3 | Status: SHIPPED | OUTPATIENT
Start: 2019-07-01 | End: 2020-07-02

## 2019-07-01 RX ORDER — INSULIN HUMAN 100 [IU]/ML
INJECTION, SUSPENSION SUBCUTANEOUS
Qty: 10 ML | Refills: 2 | Status: SHIPPED | OUTPATIENT
Start: 2019-07-01 | End: 2019-07-01 | Stop reason: SDUPTHER

## 2019-07-01 NOTE — PATIENT INSTRUCTIONS
Diabetes Foot Health: Care Instructions  Your Care Instructions    When you have diabetes, your feet need extra care and attention. Diabetes can damage the nerve endings and blood vessels in your feet, making you less likely to notice when your feet are injured. Diabetes also limits your body's ability to fight infection and get blood to areas that need it. If you get a minor foot injury, it could become an ulcer or a serious infection. With good foot care, you can prevent most of these problems. Caring for your feet can be quick and easy. Most of the care can be done when you are bathing or getting ready for bed. Follow-up care is a key part of your treatment and safety. Be sure to make and go to all appointments, and call your doctor if you are having problems. It's also a good idea to know your test results and keep a list of the medicines you take. How can you care for yourself at home? · Keep your blood sugar close to normal by watching what and how much you eat, monitoring blood sugar, taking medicines if prescribed, and getting regular exercise. · Do not smoke. Smoking affects blood flow and can make foot problems worse. If you need help quitting, talk to your doctor about stop-smoking programs and medicines. These can increase your chances of quitting for good. · Eat a diet that is low in fats. High fat intake can cause fat to build up in your blood vessels and decrease blood flow. · Inspect your feet daily for blisters, cuts, cracks, or sores. If you cannot see well, use a mirror or have someone help you. · Take care of your feet:  ? Wash your feet every day. Use warm (not hot) water. Check the water temperature with your wrists or other part of your body, not your feet. ? Dry your feet well. Pat them dry. Do not rub the skin on your feet too hard. Dry well between your toes. If the skin on your feet stays moist, bacteria or a fungus can grow, which can lead to infection. ?  Keep your skin soft. Use moisturizing skin cream to keep the skin on your feet soft and prevent calluses and cracks. But do not put the cream between your toes, and stop using any cream that causes a rash. ? Clean underneath your toenails carefully. Do not use a sharp object to clean underneath your toenails. Use the blunt end of a nail file or other rounded tool. ? Trim and file your toenails straight across to prevent ingrown toenails. Use a nail clipper, not scissors. Use an emery board to smooth the edges. · Change socks daily. Socks without seams are best, because seams often rub the feet. You can find socks for people with diabetes from specialty catalogs. · Look inside your shoes every day for things like gravel or torn linings, which could cause blisters or sores. · Buy shoes that fit well:  ? Look for shoes that have plenty of space around the toes. This helps prevent bunions and blisters. ? Try on shoes while wearing the kind of socks you will usually wear with the shoes. ? Avoid plastic shoes. They may rub your feet and cause blisters. Good shoes should be made of materials that are flexible and breathable, such as leather or cloth. ? Break in new shoes slowly by wearing them for no more than an hour a day for several days. Take extra time to check your feet for red areas, blisters, or other problems after you wear new shoes. · Do not go barefoot. Do not wear sandals, and do not wear shoes with very thin soles. Thin soles are easy to puncture. They also do not protect your feet from hot pavement or cold weather. · Have your doctor check your feet during each visit. If you have a foot problem, see your doctor. Do not try to treat an early foot problem at home. Home remedies or treatments that you can buy without a prescription (such as corn removers) can be harmful. · Always get early treatment for foot problems. A minor irritation can lead to a major problem if not properly cared for early.   When should you call for help? Call your doctor now or seek immediate medical care if:    · You have a foot sore, an ulcer or break in the skin that is not healing after 4 days, bleeding corns or calluses, or an ingrown toenail.     · You have blue or black areas, which can mean bruising or blood flow problems.     · You have peeling skin or tiny blisters between your toes or cracking or oozing of the skin.     · You have a fever for more than 24 hours and a foot sore.     · You have new numbness or tingling in your feet that does not go away after you move your feet or change positions.     · You have unexplained or unusual swelling of the foot or ankle.    Watch closely for changes in your health, and be sure to contact your doctor if:    · You cannot do proper foot care. Where can you learn more? Go to http://elma-dillan.info/. Enter A739 in the search box to learn more about \"Diabetes Foot Health: Care Instructions. \"  Current as of: July 25, 2018  Content Version: 11.9  © 3182-8085 [employer]. Care instructions adapted under license by [employer] (which disclaims liability or warranty for this information). If you have questions about a medical condition or this instruction, always ask your healthcare professional. [address] any warranty or liability for your use of this information.

## 2019-07-01 NOTE — PROGRESS NOTES
Chief Complaint   Patient presents with    Follow Up Chronic Condition     3 month; check A1C and Foot Check     1. Have you been to the ER, urgent care clinic since your last visit? Hospitalized since your last visit? No    2. Have you seen or consulted any other health care providers outside of the [address] [place] [address] since your last visit? Include any pap smears or colon screening.  No

## 2019-07-01 NOTE — PROGRESS NOTES
[facility]    CC: F/U for chronic disease management    HPI:     DMII:  -Taking medication as prescribed  -Denies any side effects or issues with his medication  -Has been checking blood sugar at home, but forgot to bring log to visit  -Reports that his FBS has not been at goal of <130  -Not following a regular exercise regimen        HTN:  -Not taking amlodipine, as Caduet was not covered  -Denies any side effects from his BP medication  -Does not check his BP at home  -Not following a regular exercise regimen        HLD:  -Not taking prescribed statin, as Caduet was not covered  -Not following a regular exercise regimen       ROS: Positive items marked in RED  CON: fever, chills  Cardiovascular: palpitations, CP  Resp: SOB, cough  GI: nausea, vomiting, diarrhea  : dysuria, hematuria      Past Medical History:   Diagnosis Date    [facility]     Hypertension     Morbid obesity ([facility]        Past Surgical History:   Procedure Laterality Date    HX CYST INCISION AND DRAINAGE  02/14/2019    Abscess       Family History   Problem Relation Age of Onset    Diabetes Mother     Kidney Disease Father        Social History     Socioeconomic History    Marital status: UNKNOWN     Spouse name: Not on file    Number of children: Not on file    Years of education: Not on file    Highest education level: Not on file   Tobacco Use    Smoking status: Never Smoker    Smokeless tobacco: Never Used   Substance and Sexual Activity    Alcohol use: Not Currently    Drug use: Never    Sexual activity: Not Currently       No Known Allergies      Current Outpatient Medications:     amLODIPine-atorvastatin (CADUET) 10-40 mg per tablet, Take 1 Tab by mouth daily. , Disp: 30 Tab, Rfl: 6    lisinopril (PRINIVIL, ZESTRIL) 10 mg tablet, Take 1 Tab by mouth daily.  Indications: high blood pressure, Disp: 30 Tab, Rfl: 4    acetaminophen (TYLENOL) 325 mg tablet, Take 650 mg by mouth., Disp: , Rfl:     ONETOUCH ULTRA BLUE TEST STRIP strip, TEST BLOOD SUGAR ONCE D, Disp: , Rfl: 3    Blood-Glucose Meter (ONETOUCH ULTRA2) monitoring kit, CHECK BLOOD SUGAR, Disp: , Rfl:     docusate sodium (COLACE) 100 mg capsule, Take 100 mg by mouth., Disp: , Rfl:     HUMULIN 70/30 U-100 INSULIN 100 unit/mL (70-30) injection, INJECT 20 UNITS BENEATH THE SKIN BID, Disp: , Rfl: 2    ONE TOUCH DELICA 33 gauge misc, CHECK BLOOD SUGAR ONCE D, Disp: , Rfl: 3    Lancing Device misc, 1 Each., Disp: , Rfl:     metFORMIN (GLUCOPHAGE) 1,000 mg tablet, Take 1 Tab by mouth two (2) times a day., Disp: 60 Tab, Rfl: 3    acyclovir (ZOVIRAX) 800 mg tablet, Take 800 mg by mouth two (2) times a day., Disp: , Rfl:     white petrolatum-mineral oil (LACRILUBE S.O.P.) 56.8-42.5 % ointment, Administer  to both eyes every twelve (12) hours. , Disp: , Rfl:     predniSONE (DELTASONE) 10 mg tablet, Take  by mouth daily (with breakfast). , Disp: , Rfl:     Physical Exam:      /88   Pulse 79   Temp 97.9 °F (36.6 °C) (Oral)   Resp 17   Ht 5' 9\" (1.753 m)   Wt 284 lb (128.8 kg)   SpO2 95%   BMI 41.94 kg/m²     General: obese habitus, NAD, conversant  Eyes: sclera clear bilaterally, no discharge noted, eyelids normal in appearance  HENT: NCAT  Lungs: CTAB, normal respiratory effort and rate  CV: RRR, no MRGs  ABD: soft, non-tender, non-distended, normal bowel sounds  Psych: alert and oriented to person, place and situation, normal affect  Neuro: speech normal, moving all extremities, gait normal    Diabetic foot exam:     Left Foot:   Visual Exam: normal    Pulse DP: 1+ (weak)   Filament test: reduced sensation    Vibratory sensation: diminished   Proprioception: abnormal      Right Foot:   Visual Exam: normal    Pulse DP: 1+ (weak)   Filament test: reduced sensation    Vibratory sensation: diminished    Proprioception: normal    Results for Tejal Cheek (MRN [id number]):   Ref.  Range 7/1/2019 15:19   Hemoglobin A1c (POC) Latest Units: % 8.9       Assessment/Plan     DMII, Inadequately Controlled:  -HGBA1c not at goal of <7%  -Insulin increased to 22 units in AM and 23 units in PM  -Advised to bring blood sugar log to next appointment as previously discussed  -Referred to podiatry, given abnormal foot exam  -Plan for urine microalbumin/creatinine at next visit  -F/U in 1 month        HTN, Inadequately Controlled:  -10 year ASCVD risk of 30.6%  -Not at goal BP of <130/80  -Caduet discontinued  -Will start on separate amlodipine regimen  -Plan for repeat urine microalbumin/creatinine at next visit  -F/U in 1 month        HLD, Inadequately Controlled:  -10 year ASCVD risk of 30.6%  -Caduet discontinued  -Will start on separate atorvastatin regimen  -F/U in 1 month         Barrett Glover MD  7/1/2019, 3:01 PM

## 2019-07-08 PROBLEM — E78.5 HYPERLIPIDEMIA: Status: ACTIVE | Noted: 2019-07-08

## 2019-07-15 RX ORDER — CALCIUM CARB/VITAMIN D3/VIT K1 500-100-40
TABLET,CHEWABLE ORAL
COMMUNITY
Start: 2019-02-22 | End: 2019-07-15 | Stop reason: SDUPTHER

## 2019-07-19 RX ORDER — CALCIUM CARB/VITAMIN D3/VIT K1 500-100-40
TABLET,CHEWABLE ORAL
Qty: 200 SYRINGE | Refills: 1 | Status: SHIPPED | OUTPATIENT
Start: 2019-07-19

## 2019-09-18 ENCOUNTER — OFFICE VISIT (OUTPATIENT)
Dept: FAMILY MEDICINE CLINIC | Age: 64
End: 2019-09-18

## 2019-09-18 VITALS
DIASTOLIC BLOOD PRESSURE: 62 MMHG | OXYGEN SATURATION: 99 % | SYSTOLIC BLOOD PRESSURE: 142 MMHG | WEIGHT: 284 LBS | RESPIRATION RATE: 14 BRPM | TEMPERATURE: 96.6 F | HEART RATE: 69 BPM | BODY MASS INDEX: 42.06 KG/M2 | HEIGHT: 69 IN

## 2019-09-18 DIAGNOSIS — E11.8 TYPE 2 DIABETES MELLITUS WITH COMPLICATION, WITH LONG-TERM CURRENT USE OF INSULIN (HCC): Primary | ICD-10-CM

## 2019-09-18 DIAGNOSIS — E78.5 HYPERLIPIDEMIA, UNSPECIFIED HYPERLIPIDEMIA TYPE: ICD-10-CM

## 2019-09-18 DIAGNOSIS — Z79.4 TYPE 2 DIABETES MELLITUS WITH COMPLICATION, WITH LONG-TERM CURRENT USE OF INSULIN (HCC): Primary | ICD-10-CM

## 2019-09-18 DIAGNOSIS — I10 ESSENTIAL HYPERTENSION WITH GOAL BLOOD PRESSURE LESS THAN 130/80: ICD-10-CM

## 2019-09-18 RX ORDER — LISINOPRIL 20 MG/1
20 TABLET ORAL DAILY
Qty: 30 TAB | Refills: 3 | Status: SHIPPED | OUTPATIENT
Start: 2019-09-18 | End: 2019-10-22 | Stop reason: DRUGHIGH

## 2019-09-18 NOTE — PROGRESS NOTES
1. Have you been to the ER, urgent care clinic since your last visit? Hospitalized since your last visit? No    2. Have you seen or consulted any other health care providers outside of the [address] [place] [address] since your last visit? Include any pap smears or colon screening.  No

## 2019-09-18 NOTE — PROGRESS NOTES
[facility]    CC: F/U for chronic disease management    HPI:     DMII:  -Taking diabetic medications prescribed  -Denies any side effects or issues with medication  -Has been checking blood sugar at home.  No log brought in for review.  -Saw podiatrist last month and blood work was done at that time to check his diabetes  -Does not have a regular exercise regimen  -Reports he has decreased the amount of food he is eating in his diet      Hypertension:  -Taking BP medication prescribed  -Denies any side effects or issues medication  -Not checking blood pressure at home  -Does not have a regular exercise regimen  -Reports he has decreased the amount of food he is eating in his diet      Hyperlipidemia:  -Taking statin as prescribed  -Denies any side effects or issues with the medication  -Does not have a regular exercise regimen  -Reports he has decreased the amount of food he is eating in his diet      ROS: Positive items marked in RED  CON: fever, chills  Cardiovascular: palpitations, CP  Resp: SOB, cough  GI: nausea, vomiting, diarrhea  : dysuria, hematuria      Past Medical History:   Diagnosis Date    [facility] [facility]     Hypertension     [facility] obesity ([facility]        Past Surgical History:   Procedure Laterality Date    HX CYST INCISION AND DRAINAGE  02/14/2019    Abscess       Family History   Problem Relation Age of Onset    Diabetes Mother     Kidney Disease Father        Social History     Socioeconomic History    Marital status: UNKNOWN     Spouse name: Not on file    Number of children: Not on file    Years of education: Not on file    Highest education level: Not on file   Tobacco Use    Smoking status: Never Smoker    Smokeless tobacco: Never Used   Substance and Sexual Activity    Alcohol use: Not Currently    Drug use: Never    Sexual activity: Not Currently       No Known Allergies      Current Outpatient Medications:     Insulin Syringe-Needle U-100 1 mL 31 gauge x 5/16 syrg, U UTD BID BEFORE MEALS, Disp: 200 Syringe, Rfl: 1    amLODIPine (NORVASC) 10 mg tablet, Take 1 Tab by mouth daily. , Disp: 90 Tab, Rfl: 1    atorvastatin (LIPITOR) 40 mg tablet, Take 1 Tab by mouth daily. , Disp: 90 Tab, Rfl: 1    metFORMIN (GLUCOPHAGE) 1,000 mg tablet, Take 1 Tab by mouth two (2) times a day., Disp: 180 Tab, Rfl: 3    HUMULIN 70/30 U-100 INSULIN 100 unit/mL (70-30) injection, INJECT 22 UNITS BENEATH THE SKIN IN MORNING AND 23 UNITS IN EVENING  Indications: type 2 diabetes mellitus, Disp: 10 mL, Rfl: 2    lisinopril (PRINIVIL, ZESTRIL) 10 mg tablet, Take 1 Tab by mouth daily.  Indications: high blood pressure, Disp: 30 Tab, Rfl: 4    ONETOUCH ULTRA BLUE TEST STRIP strip, TEST BLOOD SUGAR ONCE D, Disp: , Rfl: 3    Blood-Glucose Meter (ONETOUCH ULTRA2) monitoring kit, CHECK BLOOD SUGAR, Disp: , Rfl:     ONE TOUCH DELICA 33 gauge misc, CHECK BLOOD SUGAR ONCE D, Disp: , Rfl: 3    Lancing Device misc, 1 Each., Disp: , Rfl:     acetaminophen (TYLENOL) 325 mg tablet, Take 650 mg by mouth., Disp: , Rfl:     acyclovir (ZOVIRAX) 800 mg tablet, Take 800 mg by mouth two (2) times a day., Disp: , Rfl:     Physical Exam:      /64   Pulse 70   Temp 96.6 °F (35.9 °C) (Oral)   Resp 14   Ht 5' 9\" (1.753 m)   Wt 284 lb (128.8 kg)   SpO2 99%   BMI 41.94 kg/m²     General: obese habitus, NAD, conversant  Eyes: sclera clear bilaterally, no discharge noted, eyelids normal in appearance  HENT: NCAT  Lungs: CTAB, normal respiratory effort and rate  CV: RRR, no MRGs  ABD: soft, non-tender, non-distended, normal bowel sounds  Skin: normal temperature, turgor, color, and texture  Psych: alert and oriented to person, place and situation, normal affect  Neuro: speech normal, moving all extremities, gait normal      Hgb A1C With Est Avg Glucose (08/15/2019 3:19 PM EDT)   Component Value Ref Range Performed At Pathologist Signature   Hemoglobin A1c 8.1 (H) 4.8 - 5.9 % [facility]     Estimated Average Glucose 184 (H) 91 - 123 mg/dL [facility]         Comprehensive Metabolic Panel (08/66/9189 3:19 PM EDT)   Component Value Ref Range Performed At Pathologist Signature   Potassium 4.3 3.5 - 5.5 mmol/L [facility]     Sodium 145 133 - 145 mmol/L [facility]     Chloride 105 98 - 110 mmol/L [facility]     Glucose 121 (H) 70 - 99 mg/dL [facility]     Calcium 9.6 8.4 - 10.4 mg/dL [facility]     Albumin 4.1 3.5 - 5.0 g/dL [facility]     SGPT (ALT) 14 5 - 40 U/L [facility]     SGOT (AST) 12 10 - 37 U/L [facility]     Bilirubin Total 0.6 0.2 - 1.2 mg/dL [facility]     Alkaline Phosphatase 110 40 - 125 U/L [facility]     BUN 12 6 - 22 mg/dL [facility]     CO2 26 20 - 32 mmol/L [facility]     Creatinine 0.7 (L) 0.8 - 1.6 mg/dL [facility]     eGFR  >60.0 >60.0 [facility]     eGFR Non African American >60.0 >60.0 [facility]     Globulin 2.7 2.0 - 4.0 g/dL [facility]     A/G Ratio 1.5 1.1 - 2.6 ratio [facility]     Total Protein 6.8 6.2 - 8.1 g/dL [facility]     Anion Gap 14.0 mmol/L [facility]         Assessment/Plan     DMII, improving:  -Hemoglobin A1c ordered by podiatry shows improving hemoglobin A1c, although it is too early to clearly show full effect of insulin dose increased done 2 months ago  -Will continue current diabetic regimen  -Urine microalbumin/creatinine ordered  -Plan for repeat hemoglobin A1c at next visit  -Follow-up in 1 month      Hypertension:  -Not at goal BP of less than 130/80  -Will increase lisinopril regimen to 20 mg daily  -Urine microalbumin/creatinine ordered  -Follow-up in 1 month      Hyperlipidemia:  -Will continue current statin regimen  -Follow-up in 1 month        Vesna Dallas MD  9/18/2019, 8:14 AM

## 2019-09-19 LAB
CREATININE URINE,9612018: 56 MG/DL (ref 20–320)
MICROALBUMIN,URINE RANDOM 140054: 1.3 MG/DL
MICROALBUMIN/CREAT RATIO: 23 MCG/MG CREAT

## 2019-10-22 ENCOUNTER — OFFICE VISIT (OUTPATIENT)
Dept: FAMILY MEDICINE CLINIC | Age: 64
End: 2019-10-22

## 2019-10-22 VITALS
HEART RATE: 75 BPM | OXYGEN SATURATION: 97 % | HEIGHT: 69 IN | TEMPERATURE: 97.7 F | SYSTOLIC BLOOD PRESSURE: 148 MMHG | DIASTOLIC BLOOD PRESSURE: 65 MMHG | RESPIRATION RATE: 12 BRPM | BODY MASS INDEX: 42.06 KG/M2 | WEIGHT: 284 LBS

## 2019-10-22 DIAGNOSIS — Z79.4 TYPE 2 DIABETES MELLITUS WITH COMPLICATION, WITH LONG-TERM CURRENT USE OF INSULIN (HCC): Primary | ICD-10-CM

## 2019-10-22 DIAGNOSIS — E11.8 TYPE 2 DIABETES MELLITUS WITH COMPLICATION, WITH LONG-TERM CURRENT USE OF INSULIN (HCC): Primary | ICD-10-CM

## 2019-10-22 DIAGNOSIS — I10 ESSENTIAL HYPERTENSION WITH GOAL BLOOD PRESSURE LESS THAN 130/80: ICD-10-CM

## 2019-10-22 RX ORDER — LISINOPRIL 40 MG/1
40 TABLET ORAL DAILY
Qty: 90 TAB | Refills: 3 | Status: SHIPPED | OUTPATIENT
Start: 2019-10-22 | End: 2019-12-02 | Stop reason: ALTCHOICE

## 2019-10-22 RX ORDER — INSULIN HUMAN 100 [IU]/ML
INJECTION, SUSPENSION SUBCUTANEOUS
Qty: 10 ML | Refills: 2 | Status: SHIPPED | OUTPATIENT
Start: 2019-10-22 | End: 2019-11-05 | Stop reason: SDUPTHER

## 2019-10-22 NOTE — PROGRESS NOTES
1. Have you been to the ER, urgent care clinic since your last visit? Hospitalized since your last visit? No    2. Have you seen or consulted any other health care providers outside of the [address] [place] [address] since your last visit? Include any pap smears or colon screening.  No

## 2019-10-31 NOTE — PROGRESS NOTES
[facility]    CC: Follow-up for diabetes and hypertension    HPI:     DMII:  -Got requested urine test  -Taking diabetic medications prescribed  -Denies any side effects or issues with medication  -Has been checking blood sugar at home.   -Fasting blood sugars have been slightly labile  -Does not have a regular exercise regimen        Hypertension:  -Got requested urine test  -Taking BP medication prescribed  -Denies any side effects or issues medication  -Not checking blood pressure at home  -Does not have a regular exercise regimen      ROS: Positive items marked in RED  CON: fever, chills  Cardiovascular: palpitations, CP  Resp: SOB, cough  GI: nausea, vomiting, diarrhea  : dysuria, hematuria      Past Medical History:   Diagnosis Date    [facility] [facility]     Hypertension     Morbid obesity ([facility]        Past Surgical History:   Procedure Laterality Date    HX CYST INCISION AND DRAINAGE  02/14/2019    Abscess       Family History   Problem Relation Age of Onset    Diabetes Mother     Kidney Disease Father        Social History     Socioeconomic History    Marital status: UNKNOWN     Spouse name: Not on file    Number of children: Not on file    Years of education: Not on file    Highest education level: Not on file   Tobacco Use    Smoking status: Never Smoker    Smokeless tobacco: Never Used   Substance and Sexual Activity    Alcohol use: Not Currently    Drug use: Never    Sexual activity: Not Currently       No Known Allergies      Current Outpatient Medications:     lisinopril (PRINIVIL, ZESTRIL) 40 mg tablet, Take 1 Tab by mouth daily. , Disp: 90 Tab, Rfl: 3    HUMULIN 70/30 U-100 INSULIN 100 unit/mL (70-30) injection, INJECT 25 UNITS BENEATH THE SKIN IN MORNING AND 23 UNITS IN EVENING  Indications: type 2 diabetes mellitus, Disp: 10 mL, Rfl: 2    ONETOUCH ULTRA BLUE TEST STRIP strip, TEST BLOOD SUGAR ONCE D, Disp: 100 Strip, Rfl: 3    Insulin Syringe-Needle U-100 1 mL 31 gauge x 5/16 syrg, U UTD BID BEFORE MEALS, Disp: 200 Syringe, Rfl: 1    amLODIPine (NORVASC) 10 mg tablet, Take 1 Tab by mouth daily. , Disp: 90 Tab, Rfl: 1    atorvastatin (LIPITOR) 40 mg tablet, Take 1 Tab by mouth daily. , Disp: 90 Tab, Rfl: 1    metFORMIN (GLUCOPHAGE) 1,000 mg tablet, Take 1 Tab by mouth two (2) times a day., Disp: 180 Tab, Rfl: 3    Blood-Glucose Meter (ONETOUCH ULTRA2) monitoring kit, CHECK BLOOD SUGAR, Disp: , Rfl:     Lancing Device misc, 1 Each., Disp: , Rfl:     acyclovir (ZOVIRAX) 800 mg tablet, Take 800 mg by mouth two (2) times a day., Disp: , Rfl:     acetaminophen (TYLENOL) 325 mg tablet, Take 650 mg by mouth., Disp: , Rfl:     Physical Exam:      /65   Pulse 75   Temp 97.7 °F (36.5 °C) (Oral)   Resp 12   Ht 5' 9\" (1.753 m)   Wt 284 lb (128.8 kg)   SpO2 97%   BMI 41.94 kg/m²     General: Obese habitus, NAD, conversant  Eyes: sclera clear bilaterally, no discharge noted, eyelids normal in appearance  HENT: NCAT  Lungs: CTAB, normal respiratory effort and rate  CV: RRR, no MRGs  ABD: soft, non-tender, non-distended, normal bowel sounds  Skin: normal temperature, turgor, color, and texture  Psych: alert and oriented to person, place and situation, normal affect  Neuro: speech normal, moving all extremities, gait normal    Results for Bull Crabtree (MRN [id number]):   Ref.  Range 9/18/2019 08:57   Microalbumin, urine Latest Units: mg/dL 1.3   Microalbumin/Creat Ratio Latest Ref Range: <30 mcg/mg creat 23       Assessment/Plan     DMII, improving:  -Hemoglobin A1c not at gaol of <7%  -Will increase a.m. dose of insulin to 25 units  -Follow-up in 1 month        Hypertension:  -Not at goal BP of less than 130/80  -Will increase lisinopril regimen to 40 mg daily  -Urine microalbumin/creatinine ordered  -Follow-up in 1 month      Alla Jordan MD  10/22/2019

## 2019-12-02 ENCOUNTER — OFFICE VISIT (OUTPATIENT)
Dept: FAMILY MEDICINE CLINIC | Age: 64
End: 2019-12-02

## 2019-12-02 VITALS
OXYGEN SATURATION: 95 % | SYSTOLIC BLOOD PRESSURE: 154 MMHG | HEART RATE: 76 BPM | DIASTOLIC BLOOD PRESSURE: 66 MMHG | WEIGHT: 284.6 LBS | TEMPERATURE: 96.6 F | BODY MASS INDEX: 42.15 KG/M2 | RESPIRATION RATE: 16 BRPM | HEIGHT: 69 IN

## 2019-12-02 DIAGNOSIS — E11.8 TYPE 2 DIABETES MELLITUS WITH COMPLICATION, WITH LONG-TERM CURRENT USE OF INSULIN (HCC): Primary | ICD-10-CM

## 2019-12-02 DIAGNOSIS — Z79.4 TYPE 2 DIABETES MELLITUS WITH COMPLICATION, WITH LONG-TERM CURRENT USE OF INSULIN (HCC): Primary | ICD-10-CM

## 2019-12-02 DIAGNOSIS — I10 ESSENTIAL HYPERTENSION WITH GOAL BLOOD PRESSURE LESS THAN 130/80: ICD-10-CM

## 2019-12-02 LAB — HBA1C MFR BLD HPLC: 7.3 %

## 2019-12-02 RX ORDER — LISINOPRIL AND HYDROCHLOROTHIAZIDE 12.5; 2 MG/1; MG/1
2 TABLET ORAL DAILY
Qty: 60 TAB | Refills: 2 | Status: SHIPPED | OUTPATIENT
Start: 2019-12-02 | End: 2020-02-17 | Stop reason: DRUGHIGH

## 2019-12-02 RX ORDER — INSULIN HUMAN 100 [IU]/ML
INJECTION, SUSPENSION SUBCUTANEOUS
Qty: 10 ML | Refills: 2 | Status: SHIPPED | OUTPATIENT
Start: 2019-12-02 | End: 2020-03-06

## 2019-12-02 NOTE — PROGRESS NOTES
[facility]    CC: F/U for chronic disease management    HPI:     HTN:  -Taking BP medication as prescribed   -Denies any issues or side effects with BP medication  -Has not been checking BP at home  -Diet is unchanged since last visit  -Has not been following a regular exercise regimen      DMII:  -Taking diabetic medication as prescribed. -Denies any side effects or issues with diabetic medication  -Does not check blood sugar at home. -Does not follow a regular exercise regimen  -Diet is unchanged since last visit      ROS: Positive items marked in RED  CON: fever, chills  Cardiovascular: palpitations, CP  Resp: SOB, cough  GI: nausea, vomiting, diarrhea  : dysuria, hematuria      Past Medical History:   Diagnosis Date    [facility]     HLD (hyperlipidemia)     Hypertension     Morbid obesity ([facility]        Past Surgical History:   Procedure Laterality Date    HX CYST INCISION AND DRAINAGE  02/14/2019    Abscess       Family History   Problem Relation Age of Onset    Diabetes Mother     Kidney Disease Father        Social History     Socioeconomic History    Marital status: UNKNOWN     Spouse name: Not on file    Number of children: Not on file    Years of education: Not on file    Highest education level: Not on file   Tobacco Use    Smoking status: Never Smoker    Smokeless tobacco: Never Used   Substance and Sexual Activity    Alcohol use: Not Currently    Drug use: Never    Sexual activity: Not Currently       No Known Allergies      Current Outpatient Medications:     HUMULIN 70/30 U-100 INSULIN 100 unit/mL (70-30) injection, INJECT 25 UNITS BENEATH THE SKIN IN THE MORNING AND 23 UNITS IN THE EVENING, Disp: 10 mL, Rfl: 2    lisinopril (PRINIVIL, ZESTRIL) 40 mg tablet, Take 1 Tab by mouth daily. , Disp: 90 Tab, Rfl: 3    ONETOUCH ULTRA BLUE TEST STRIP strip, TEST BLOOD SUGAR ONCE D, Disp: 100 Strip, Rfl: 3    Insulin Syringe-Needle U-100 1 mL 31 gauge x 5/16 syrg, U UTD BID BEFORE MEALS, Disp: 200 Syringe, Rfl: 1    amLODIPine (NORVASC) 10 mg tablet, Take 1 Tab by mouth daily. , Disp: 90 Tab, Rfl: 1    atorvastatin (LIPITOR) 40 mg tablet, Take 1 Tab by mouth daily. , Disp: 90 Tab, Rfl: 1    metFORMIN (GLUCOPHAGE) 1,000 mg tablet, Take 1 Tab by mouth two (2) times a day., Disp: 180 Tab, Rfl: 3    acetaminophen (TYLENOL) 325 mg tablet, Take 650 mg by mouth., Disp: , Rfl:     Blood-Glucose Meter (ONETOUCH ULTRA2) monitoring kit, CHECK BLOOD SUGAR, Disp: , Rfl:     Lancing Device misc, 1 Each., Disp: , Rfl:     acyclovir (ZOVIRAX) 800 mg tablet, Take 800 mg by mouth two (2) times a day., Disp: , Rfl:     Physical Exam:      /66   Pulse 76   Temp 96.6 °F (35.9 °C) (Oral)   Resp 16   Ht 5' 9\" (1.753 m)   Wt 284 lb 9.6 oz (129.1 kg)   SpO2 95%   BMI 42.03 kg/m²     General: obese habitus, NAD, conversant  Eyes: sclera clear bilaterally, no discharge noted, eyelids normal in appearance  HENT: NCAT  Lungs: CTAB, normal respiratory effort and rate  CV: RRR, no MRGs  ABD: soft, non-tender, non-distended, normal bowel sounds  Skin: normal temperature, turgor, color, and texture  Psych: alert and oriented to person, place and situation, normal affect  Neuro: speech normal, moving all extremities, gait normal    Results for [place]  (MRN [id number]):   Ref.  Range 12/2/2019 14:13   Hemoglobin A1c (POC) Latest Units: % 7.3       Assessment/Plan     DMII, Improving:  -Not at goal HGBA1c of <7%  -Will increase AM and PM dose of insulin by 1 unit  -Advised on importance of checking blood sugar at home and bringing log to next visit  -F/U in one month      HTN, Inadequately Controlled:  -Not at goal BP of <130/80  -Will add 25 mg daily of HCTZ to current BP medication regimen  -F/U in one month        Sonido Humphries MD  12/2/2019, 2:00 PM

## 2019-12-02 NOTE — PROGRESS NOTES
1. Have you been to the ER, urgent care clinic since your last visit? Hospitalized since your last visit? No    2. Have you seen or consulted any other health care providers outside of the [address] [place] [address] since your last visit? Include any pap smears or colon screening.  No

## 2020-01-10 ENCOUNTER — OFFICE VISIT (OUTPATIENT)
Dept: FAMILY MEDICINE CLINIC | Age: 65
End: 2020-01-10

## 2020-01-10 VITALS
HEIGHT: 69 IN | HEART RATE: 78 BPM | TEMPERATURE: 96.7 F | OXYGEN SATURATION: 97 % | DIASTOLIC BLOOD PRESSURE: 69 MMHG | SYSTOLIC BLOOD PRESSURE: 159 MMHG | RESPIRATION RATE: 16 BRPM | WEIGHT: 292 LBS | BODY MASS INDEX: 43.25 KG/M2

## 2020-01-10 DIAGNOSIS — Z79.4 TYPE 2 DIABETES MELLITUS WITH COMPLICATION, WITH LONG-TERM CURRENT USE OF INSULIN (HCC): Primary | ICD-10-CM

## 2020-01-10 DIAGNOSIS — E11.8 TYPE 2 DIABETES MELLITUS WITH COMPLICATION, WITH LONG-TERM CURRENT USE OF INSULIN (HCC): Primary | ICD-10-CM

## 2020-01-10 RX ORDER — INSULIN PUMP SYRINGE, 3 ML
EACH MISCELLANEOUS
Qty: 1 KIT | Refills: 0 | Status: SHIPPED | OUTPATIENT
Start: 2020-01-10

## 2020-01-10 NOTE — PROGRESS NOTES
1. Have you been to the ER, urgent care clinic since your last visit? Hospitalized since your last visit? No    2. Have you seen or consulted any other health care providers outside of the [address] [place] [address] since your last visit? Include any pap smears or colon screening. No        Patient states he has not taken his medications this morning. A user error has taken place: encounter opened in error, closed for administrative reasons.

## 2020-02-17 ENCOUNTER — OFFICE VISIT (OUTPATIENT)
Dept: FAMILY MEDICINE CLINIC | Age: 65
End: 2020-02-17

## 2020-02-17 VITALS
HEIGHT: 69 IN | OXYGEN SATURATION: 98 % | WEIGHT: 291 LBS | TEMPERATURE: 96.7 F | HEART RATE: 78 BPM | RESPIRATION RATE: 18 BRPM | BODY MASS INDEX: 43.1 KG/M2 | SYSTOLIC BLOOD PRESSURE: 140 MMHG | DIASTOLIC BLOOD PRESSURE: 62 MMHG

## 2020-02-17 DIAGNOSIS — Z01.00 DIABETIC EYE EXAM (HCC): ICD-10-CM

## 2020-02-17 DIAGNOSIS — I10 ESSENTIAL HYPERTENSION WITH GOAL BLOOD PRESSURE LESS THAN 130/80: Primary | ICD-10-CM

## 2020-02-17 DIAGNOSIS — E11.8 TYPE 2 DIABETES MELLITUS WITH COMPLICATION, WITH LONG-TERM CURRENT USE OF INSULIN (HCC): ICD-10-CM

## 2020-02-17 DIAGNOSIS — Z12.11 COLON CANCER SCREENING: ICD-10-CM

## 2020-02-17 DIAGNOSIS — E11.9 DIABETIC EYE EXAM (HCC): ICD-10-CM

## 2020-02-17 DIAGNOSIS — Z79.4 TYPE 2 DIABETES MELLITUS WITH COMPLICATION, WITH LONG-TERM CURRENT USE OF INSULIN (HCC): ICD-10-CM

## 2020-02-17 RX ORDER — LISINOPRIL AND HYDROCHLOROTHIAZIDE 20; 25 MG/1; MG/1
2 TABLET ORAL DAILY
Qty: 180 TAB | Refills: 3 | Status: SHIPPED | OUTPATIENT
Start: 2020-02-17 | End: 2020-04-05 | Stop reason: SDUPTHER

## 2020-02-17 NOTE — PROGRESS NOTES
1. Have you been to the ER, urgent care clinic since your last visit? Hospitalized since your last visit? No    2. Have you seen or consulted any other health care providers outside of the [address] [place] [address] since your last visit? Include any pap smears or colon screening.  No

## 2020-02-17 NOTE — PROGRESS NOTES
[facility]    CC: Follow-up for DMII and HTN    HPI:     DMII:  -Taking diabetic medication as prescribed  -Denies any side effects or issues with diabetic medication  -Has not been able to check blood sugar at home due to not having glucose monitor  -Reports that his insurance will pay for new glucose monitor on the 22nd of this month  -Not following any regular exercise regimen  -Has reduce calories in diet  -Reports that he has not had any diabetic eye exam        HTN:  -Taking BP medications prescribed  -Denies any side effects or issues with BP medication  -Does not check BP at home  -Not following any regular exercise regimen  -Has reduced calories in diet      ROS: Positive items marked in RED  CON: fever, chills  Cardiovascular: palpitations, CP  Resp: SOB, cough  GI: nausea, vomiting, diarrhea  : dysuria, hematuria      Past Medical History:   Diagnosis Date    [facility] type 2) [facility]     HLD (hyperlipidemia)     Hypertension     [facility]        Past Surgical History:   Procedure Laterality Date    HX CYST INCISION AND DRAINAGE  02/14/2019    Abscess       Family History   Problem Relation Age of Onset    Diabetes Mother     Kidney Disease Father        Social History     Socioeconomic History    Marital status: UNKNOWN     Spouse name: Not on file    Number of children: Not on file    Years of education: Not on file    Highest education level: Not on file   Tobacco Use    Smoking status: Never Smoker    Smokeless tobacco: Never Used   Substance and Sexual Activity    Alcohol use: Not Currently    Drug use: Never    Sexual activity: Not Currently       No Known Allergies      Current Outpatient Medications:     Blood-Glucose Meter (ONETOUCH ULTRA2 METER) monitoring kit, Please dispense formulary glucometer covered by patients insurance to check blood sugar twice daily, Disp: 1 Kit, Rfl: 0    lisinopril-hydroCHLOROthiazide ([facility]) 20-12.5 mg per tablet, Take 2 Tabs by mouth daily. , Disp: 60 Tab, Rfl: 2    HUMULIN 70/30 U-100 INSULIN 100 unit/mL (70-30) injection, INJECT 26 UNITS BENEATH THE SKIN IN THE MORNING AND 24 UNITS IN THE EVENING (Patient taking differently: INJECT 26 UNITS BENEATH THE SKIN IN THE MORNING AND 25 UNITS IN THE EVENING), Disp: 10 mL, Rfl: 2    ONETOUCH ULTRA BLUE TEST STRIP strip, TEST BLOOD SUGAR ONCE D, Disp: 100 Strip, Rfl: 3    Insulin Syringe-Needle U-100 1 mL 31 gauge x 5/16 syrg, U UTD BID BEFORE MEALS, Disp: 200 Syringe, Rfl: 1    amLODIPine (NORVASC) 10 mg tablet, Take 1 Tab by mouth daily. , Disp: 90 Tab, Rfl: 1    atorvastatin (LIPITOR) 40 mg tablet, Take 1 Tab by mouth daily. , Disp: 90 Tab, Rfl: 1    metFORMIN (GLUCOPHAGE) 1,000 mg tablet, Take 1 Tab by mouth two (2) times a day., Disp: 180 Tab, Rfl: 3    Lancing Device misc, 1 Each., Disp: , Rfl:     acyclovir (ZOVIRAX) 800 mg tablet, Take 800 mg by mouth two (2) times a day., Disp: , Rfl:     acetaminophen (TYLENOL) 325 mg tablet, Take 650 mg by mouth., Disp: , Rfl:     Physical Exam:      /60   Pulse 83   Temp 96.7 °F (35.9 °C) (Oral)   Resp 18   Ht 5' 9\" (1.753 m)   Wt 291 lb (132 kg)   SpO2 98%   BMI 42.97 kg/m²     General: Obese habitus, NAD, conversant  Eyes: sclera clear bilaterally, no discharge noted, eyelids normal in appearance  HENT: NCAT  Lungs: CTAB, normal respiratory effort and rate  CV: RRR, no MRGs  ABD: soft, non-tender, non-distended, normal bowel sounds  Skin: normal temperature, turgor, color, and texture  Psych: alert and oriented to person, place and situation, normal affect  Neuro: speech normal, moving all extremities, gait normal      Assessment/Plan     DMII:  -Will continue current diabetic medication regimen  -Plan for hemoglobin A1c at next appointment   -Referred to ophthalmology for diabetic eye exam  -F/U in one month        HTN, Inadequately Controlled:  -Not at goal BP of <130/80  -Will increase HCTZ dose to 50 mg daily  -Plan for CMP at next appointment  -F/U in one month      Health Maintenance:  -FIT ordered for colon cancer screening        Jayleen Christensen MD  2/17/2020, 3:14 PM

## 2020-03-06 DIAGNOSIS — E11.8 TYPE 2 DIABETES MELLITUS WITH COMPLICATION, WITH LONG-TERM CURRENT USE OF INSULIN (HCC): ICD-10-CM

## 2020-03-06 DIAGNOSIS — I10 ESSENTIAL HYPERTENSION WITH GOAL BLOOD PRESSURE LESS THAN 130/80: ICD-10-CM

## 2020-03-06 DIAGNOSIS — E78.5 HYPERLIPIDEMIA, UNSPECIFIED HYPERLIPIDEMIA TYPE: ICD-10-CM

## 2020-03-06 DIAGNOSIS — Z79.4 TYPE 2 DIABETES MELLITUS WITH COMPLICATION, WITH LONG-TERM CURRENT USE OF INSULIN (HCC): ICD-10-CM

## 2020-03-06 RX ORDER — AMLODIPINE BESYLATE 10 MG/1
TABLET ORAL
Qty: 90 TAB | Refills: 1 | Status: SHIPPED | OUTPATIENT
Start: 2020-03-06 | End: 2020-09-21 | Stop reason: SDUPTHER

## 2020-03-06 RX ORDER — ATORVASTATIN CALCIUM 40 MG/1
TABLET, FILM COATED ORAL
Qty: 90 TAB | Refills: 1 | Status: SHIPPED | OUTPATIENT
Start: 2020-03-06 | End: 2020-09-21 | Stop reason: SDUPTHER

## 2020-03-06 RX ORDER — INSULIN HUMAN 100 [IU]/ML
INJECTION, SUSPENSION SUBCUTANEOUS
Qty: 10 ML | Refills: 2 | Status: SHIPPED | OUTPATIENT
Start: 2020-03-06 | End: 2020-05-28 | Stop reason: SDUPTHER

## 2020-03-10 LAB — HEMOCCULT STL QL IA: POSITIVE

## 2020-04-04 DIAGNOSIS — I10 ESSENTIAL HYPERTENSION WITH GOAL BLOOD PRESSURE LESS THAN 130/80: ICD-10-CM

## 2020-04-05 RX ORDER — LISINOPRIL AND HYDROCHLOROTHIAZIDE 12.5; 2 MG/1; MG/1
TABLET ORAL
Qty: 60 TAB | Refills: 2 | OUTPATIENT
Start: 2020-04-05

## 2020-04-05 RX ORDER — LISINOPRIL AND HYDROCHLOROTHIAZIDE 20; 25 MG/1; MG/1
2 TABLET ORAL DAILY
Qty: 180 TAB | Refills: 3 | Status: SHIPPED | OUTPATIENT
Start: 2020-04-05 | End: 2020-05-28 | Stop reason: SDUPTHER

## 2020-05-28 ENCOUNTER — OFFICE VISIT (OUTPATIENT)
Dept: FAMILY MEDICINE CLINIC | Age: 65
End: 2020-05-28

## 2020-05-28 VITALS
HEIGHT: 69 IN | RESPIRATION RATE: 16 BRPM | WEIGHT: 288 LBS | TEMPERATURE: 97.2 F | SYSTOLIC BLOOD PRESSURE: 123 MMHG | BODY MASS INDEX: 42.65 KG/M2 | OXYGEN SATURATION: 98 % | DIASTOLIC BLOOD PRESSURE: 52 MMHG | HEART RATE: 75 BPM

## 2020-05-28 DIAGNOSIS — I10 ESSENTIAL HYPERTENSION WITH GOAL BLOOD PRESSURE LESS THAN 130/80: ICD-10-CM

## 2020-05-28 DIAGNOSIS — Z79.4 TYPE 2 DIABETES MELLITUS WITH COMPLICATION, WITH LONG-TERM CURRENT USE OF INSULIN (HCC): Primary | ICD-10-CM

## 2020-05-28 DIAGNOSIS — R19.5 OCCULT BLOOD POSITIVE STOOL: ICD-10-CM

## 2020-05-28 DIAGNOSIS — E11.8 TYPE 2 DIABETES MELLITUS WITH COMPLICATION, WITH LONG-TERM CURRENT USE OF INSULIN (HCC): Primary | ICD-10-CM

## 2020-05-28 DIAGNOSIS — E78.5 HYPERLIPIDEMIA, UNSPECIFIED HYPERLIPIDEMIA TYPE: ICD-10-CM

## 2020-05-28 LAB — HBA1C MFR BLD HPLC: 7.8 %

## 2020-05-28 RX ORDER — INSULIN HUMAN 100 [IU]/ML
INJECTION, SUSPENSION SUBCUTANEOUS
Qty: 10 ML | Refills: 2 | OUTPATIENT
Start: 2020-05-28 | End: 2020-06-05

## 2020-05-28 RX ORDER — LISINOPRIL AND HYDROCHLOROTHIAZIDE 20; 25 MG/1; MG/1
1 TABLET ORAL DAILY
Qty: 180 TAB | Refills: 3 | OUTPATIENT
Start: 2020-05-28 | End: 2020-09-21 | Stop reason: SDUPTHER

## 2020-05-28 NOTE — PROGRESS NOTES
[facility]    CC: Follow-up for Chronic Disease Management    HPI:     DMII:  -Taking diabetic medication as prescribed. -Denies any side effects or issues with diabetic medication  -Does check blood sugar at home. Log brought in for review.  -Has not been keep track of FBS. PM glucose appears to typically fall between 150-250.  -Not following any regular exercise regimen  -Has been eating light and having more vegetables      HTN:  -Not taking BP medication as prescribed. Has only been taking 1 tab of his lisinopril-HCTZ regimen. States he was doing this even on the prior dosage. (Did not realize the prescriptions had been for 2 tablets)  -Denies any side effects from his BP medication.  -Has not been checking BP at home. No log brought in for review  -Not following any regular exercise regimen  -Has been eating light and having more vegetables      HLD:  -Taking statin as prescribed  -Denies any side effects or issues with statin  -Not following any regular exercise regimen  -Has been eating light and having more vegetables      ROS: Positive items marked in RED  CON: fever, chills  Cardiovascular: palpitations, CP  Resp: SOB, cough  GI: nausea, vomiting, diarrhea  : dysuria, hematuria      Past Medical History:   Diagnosis Date    [facility]di[facility] 2) [facility]     HLD (hyperlipidemia)     Hypertension     Morbid obesity ([facility]        Past Surgical History:   Procedure Laterality Date    HX CYST INCISION AND DRAINAGE  02/14/2019    Abscess       Family History   Problem Relation Age of Onset    Diabetes Mother     Kidney Disease Father        Social History     Tobacco Use    Smoking status: Never Smoker    Smokeless tobacco: Never Used   Substance Use Topics    Alcohol use: Not Currently    Drug use: Never       No Known Allergies      Current Outpatient Medications:     lisinopril-hydroCHLOROthiazide (PRINZIDE, ZESTORETIC) 20-25 mg per tablet, Take 2 Tabs by mouth daily. , Disp: 180 Tab, Rfl: 3    amLODIPine (NORVASC) 10 mg tablet, TAKE 1 TABLET BY MOUTH EVERY DAY, Disp: 90 Tab, Rfl: 1    HUMULIN 70/30 U-100 INSULIN 100 unit/mL (70-30) injection, INJECT 26 UNITS BENEATH THE SKIN IN THE MORNING AND 25 UNITS IN THE EVENING, Disp: 10 mL, Rfl: 2    atorvastatin (LIPITOR) 40 mg tablet, TAKE 1 TABLET BY MOUTH DAILY, Disp: 90 Tab, Rfl: 1    Blood-Glucose Meter (ONETOUCH ULTRA2 METER) monitoring kit, Please dispense formulary glucometer covered by patients insurance to check blood sugar twice daily, Disp: 1 Kit, Rfl: 0    ONETOUCH ULTRA BLUE TEST STRIP strip, TEST BLOOD SUGAR ONCE D, Disp: 100 Strip, Rfl: 3    Insulin Syringe-Needle U-100 1 mL 31 gauge x 5/16 syrg, U UTD BID BEFORE MEALS, Disp: 200 Syringe, Rfl: 1    metFORMIN (GLUCOPHAGE) 1,000 mg tablet, Take 1 Tab by mouth two (2) times a day., Disp: 180 Tab, Rfl: 3    acetaminophen (TYLENOL) 325 mg tablet, Take 650 mg by mouth., Disp: , Rfl:     Lancing Device misc, 1 Each., Disp: , Rfl:     acyclovir (ZOVIRAX) 800 mg tablet, Take 800 mg by mouth two (2) times a day., Disp: , Rfl:     Physical Exam:      /52   Pulse 75   Temp 97.2 °F (36.2 °C) (Oral)   Resp 16   Ht 5' 9\" (1.753 m)   Wt 288 lb (130.6 kg)   SpO2 98%   BMI 42.53 kg/m²     General: obese habitus, NAD, conversant  Eyes: sclera clear bilaterally, no discharge noted, eyelids normal in appearance  HENT: NCAT  Lungs: CTAB, normal respiratory effort and rate  CV: RRR, no MRGs  ABD: soft, non-tender, non-distended, normal bowel sounds  Ext: no peripheral edema or digital cyanosis noted  Skin: normal temperature, turgor, color, and texture  Psych: alert and oriented to person, place and situation, normal affect  Neuro: speech normal, moving all extremities, gait normal      OCCULT BLOOD IMMUNOASSAY,DIAGNOSTIC (5/28/2020):  Component Value Flag Ref Range Units Status   Occult blood fecal, by IA Positive  Abnormal   Negative  Final       AMB POC HEMOGLOBIN A1C (5/28/2020):  Component Value Flag Ref Range Units Status   Hemoglobin A1c (POC) 7.8    % Final       Assessment/Plan     DMII, Inadequately Controlled:  -HGBA1c not at goal of <7%  -AM dose of 70/30 insulin increased to 29 units  -CBC, CMP, fasting lipid panel, and urine microalbumin/creatinine ordered  -F/U in 3 months for well visit        HTN, Well Controlled:  -BP at goal of <130/80  -Will officially change Lisinopril-HCTZ regimen to 1 tablet daily  -CBC, CMP, fasting lipid panel, and urine microalbumin/creatinine ordered  -F/U in 3 months for well visit      Hyperlipidemia:  -Will continue current statin regimen  -CMP and fasting lipid panel ordered  -F/U in 3 months for well visit       Occult Blood Positive Stool  -Patient counseled on finding  -Advised on importance of getting further evaluation/work-up due to possibility of colon cancer  -Referred to GI for further evaluation  -F/U in 3 months for well visit        Jeremy Ricks MD  5/28/2020, 9:14 AM

## 2020-05-28 NOTE — PROGRESS NOTES
1. Have you been to the ER, urgent care clinic since your last visit? Hospitalized since your last visit? yes skin infection on back 1 week ago went to Urgent Care    2. Have you seen or consulted any other health care providers outside of the [address] [place] [address] since your last visit? Include any pap smears or colon screening.  No

## 2020-09-21 ENCOUNTER — OFFICE VISIT (OUTPATIENT)
Dept: FAMILY MEDICINE CLINIC | Age: 65
End: 2020-09-21

## 2020-09-21 VITALS
OXYGEN SATURATION: 98 % | RESPIRATION RATE: 18 BRPM | SYSTOLIC BLOOD PRESSURE: 145 MMHG | TEMPERATURE: 97.5 F | HEART RATE: 78 BPM | HEIGHT: 69 IN | BODY MASS INDEX: 43.69 KG/M2 | WEIGHT: 295 LBS | DIASTOLIC BLOOD PRESSURE: 61 MMHG

## 2020-09-21 DIAGNOSIS — E78.5 HYPERLIPIDEMIA, UNSPECIFIED HYPERLIPIDEMIA TYPE: ICD-10-CM

## 2020-09-21 DIAGNOSIS — Z79.4 TYPE 2 DIABETES MELLITUS WITH COMPLICATION, WITH LONG-TERM CURRENT USE OF INSULIN (HCC): Primary | ICD-10-CM

## 2020-09-21 DIAGNOSIS — E11.8 TYPE 2 DIABETES MELLITUS WITH COMPLICATION, WITH LONG-TERM CURRENT USE OF INSULIN (HCC): Primary | ICD-10-CM

## 2020-09-21 DIAGNOSIS — I10 ESSENTIAL HYPERTENSION WITH GOAL BLOOD PRESSURE LESS THAN 130/80: ICD-10-CM

## 2020-09-21 LAB — HBA1C MFR BLD HPLC: 8.2 %

## 2020-09-21 RX ORDER — ATORVASTATIN CALCIUM 40 MG/1
TABLET, FILM COATED ORAL
Qty: 90 TAB | Refills: 1 | Status: SHIPPED | OUTPATIENT
Start: 2020-09-21

## 2020-09-21 RX ORDER — AMLODIPINE BESYLATE 10 MG/1
TABLET ORAL
Qty: 90 TAB | Refills: 1 | Status: SHIPPED | OUTPATIENT
Start: 2020-09-21

## 2020-09-21 RX ORDER — METFORMIN HYDROCHLORIDE 1000 MG/1
TABLET ORAL
Qty: 180 TAB | Refills: 1 | Status: CANCELLED | OUTPATIENT
Start: 2020-09-21

## 2020-09-21 RX ORDER — SITAGLIPTIN AND METFORMIN HYDROCHLORIDE 50; 1000 MG/1; MG/1
1 TABLET, FILM COATED ORAL 2 TIMES DAILY WITH MEALS
Qty: 60 TAB | Refills: 3 | Status: SHIPPED | OUTPATIENT
Start: 2020-09-21

## 2020-09-21 RX ORDER — INSULIN HUMAN 100 [IU]/ML
INJECTION, SUSPENSION SUBCUTANEOUS
Qty: 10 ML | Refills: 2 | Status: SHIPPED | OUTPATIENT
Start: 2020-09-21

## 2020-09-21 RX ORDER — LISINOPRIL AND HYDROCHLOROTHIAZIDE 20; 25 MG/1; MG/1
2 TABLET ORAL DAILY
Qty: 180 TAB | Refills: 2 | Status: SHIPPED | OUTPATIENT
Start: 2020-09-21 | End: 2022-09-28 | Stop reason: ALTCHOICE

## 2020-09-21 NOTE — PROGRESS NOTES
1. Have you been to the ER, urgent care clinic since your last visit? Hospitalized since your last visit? No    2. Have you seen or consulted any other health care providers outside of the [address] [place] [address] since your last visit? Include any pap smears or colon screening.  No

## 2020-09-21 NOTE — PROGRESS NOTES
[facility]    CC: Follow-up for Chronic Disease Management    HPI:       DMII:  -Did not get requested lab work  -Taking diabetic medication as prescribed.    -Denies any side effects or issues with diabetic medication  -Forgot to start taking new dose of insulin (Has been taking prior regimen)  -Home blood sugars have not been at goal  -Not following any regular exercise regimen  -Has been eating light and having more vegetables        HTN:  -Did not get requested lab work  -Taking BP medication as prescribed  -Denies any side effects from his BP medication.  -Has not been checking BP at home  -Not following any regular exercise regimen  -Diet unchanged since last visit        HLD:  -Did not get requested lab work  -Taking statin as prescribed  -Denies any side effects or issues with statin  -Not following any regular exercise regimen  -Diet unchanged since last visit      ROS: Positive items marked in RED  CON: fever, chills  Cardiovascular: palpitations, CP  Resp: SOB, cough  GI: nausea, vomiting, diarrhea  : dysuria, hematuria      Past Medical History:   Diagnosis Date    [facility]di[facility] 2) ([facility]     HLD (hyperlipidemia)     Hypertension     Morbid obesity ([facility]        Past Surgical History:   Procedure Laterality Date    HX CYST INCISION AND DRAINAGE  02/14/2019    Abscess       Family History   Problem Relation Age of Onset    Diabetes Mother     Kidney Disease Father        Social History     Tobacco Use    Smoking status: Never Smoker    Smokeless tobacco: Never Used   Substance Use Topics    Alcohol use: Not Currently    Drug use: Never       No Known Allergies      Current Outpatient Medications:     metFORMIN (GLUCOPHAGE) 1,000 mg tablet, TAKE 1 TABLET BY MOUTH TWICE DAILY, Disp: 180 Tab, Rfl: 1    HumuLIN 70/30 U-100 Insulin 100 unit/mL (70-30) injection, INJECT 29 UNITS BENEATH THE SKIN EVERY MORNING AND 25 UNITS IN THE EVENING, Disp: 10 mL, Rfl: 2   lisinopril-hydroCHLOROthiazide (PRINZIDE, ZESTORETIC) 20-25 mg per tablet, Take 1 Tab by mouth daily. , Disp: 180 Tab, Rfl: 3    amLODIPine (NORVASC) 10 mg tablet, TAKE 1 TABLET BY MOUTH EVERY DAY, Disp: 90 Tab, Rfl: 1    atorvastatin (LIPITOR) 40 mg tablet, TAKE 1 TABLET BY MOUTH DAILY, Disp: 90 Tab, Rfl: 1    Blood-Glucose Meter (ONETOUCH ULTRA2 METER) monitoring kit, Please dispense formulary glucometer covered by patients insurance to check blood sugar twice daily, Disp: 1 Kit, Rfl: 0    ONETOUCH ULTRA BLUE TEST STRIP strip, TEST BLOOD SUGAR ONCE D, Disp: 100 Strip, Rfl: 3    Insulin Syringe-Needle U-100 1 mL 31 gauge x 5/16 syrg, U UTD BID BEFORE MEALS, Disp: 200 Syringe, Rfl: 1    acetaminophen (TYLENOL) 325 mg tablet, Take 650 mg by mouth., Disp: , Rfl:     Lancing Device misc, 1 Each., Disp: , Rfl:     acyclovir (ZOVIRAX) 800 mg tablet, Take 800 mg by mouth two (2) times a day., Disp: , Rfl:     Physical Exam:      BP (!) 145/61   Pulse 78   Temp 97.5 °F (36.4 °C) (Oral)   Resp 18   Ht 5' 9\" (1.753 m)   Wt 295 lb (133.8 kg)   SpO2 98%   BMI 43.56 kg/m²     General: obese habitus, NAD, conversant  Eyes: sclera clear bilaterally, no discharge noted, eyelids normal in appearance  HENT: NCAT  Lungs: CTAB, normal respiratory effort and rate  CV: RRR, no MRGs  ABD: soft, non-tender, non-distended, normal bowel sounds  Ext: no peripheral edema or digital cyanosis noted  Skin: normal temperature, turgor, color, and texture  Psych: alert and oriented to person, place and situation, normal affect  Neuro: speech normal, moving all extremities, gait normal    Results for [place] (MRN [id number]):   Ref.  Range 9/21/2020 11:03   Hemoglobin A1c (POC) Latest Units: % 8.2%       Assessment/Plan     DMII, inadequately controlled:  -HGBA1c not at goal of <7%  -Instructed to take a increased AM dose of 70/30 insulin (29 units)  -CBC, CMP, fasting lipid panel, and urine microalbumin/creatinine reordered  -F/U in 3 months        HTN,  inadequately controlled:  -BP not at goal of <130/80  -Lisinopril-HCTZ regimen increased to 2 tablet daily  -CBC, CMP, fasting lipid panel, and urine microalbumin/creatinine reordered  -F/U in 3 months        Hyperlipidemia:  -Will continue current statin regimen  -CMP and fasting lipid panel reordered  -F/U in 3 months        Juanjo Cooper MD  9/21/2020, 11:06 AM

## 2020-09-22 LAB
ALB/GLOBRATIO, 58C: 1.5 (CALC) (ref 1–2.5)
ALBUMIN SERPL-MCNC: 4 G/DL (ref 3.6–5.1)
ALKALINE PHOSPHATASE, TOTAL, 25002000: 108 U/L (ref 35–144)
ALT SERPL-CCNC: 12 U/L (ref 9–46)
AST SERPL W P-5'-P-CCNC: 11 U/L (ref 10–35)
BILIRUB SERPL-MCNC: 0.4 MG/DL (ref 0.2–1.2)
BUN SERPL-MCNC: 26 MG/DL (ref 7–25)
BUN/CREATININE RATIO,BUCR: 24 (CALC) (ref 6–22)
CALCIUM SERPL-MCNC: 9.7 MG/DL (ref 8.6–10.3)
CHLORIDE SERPL-SCNC: 104 MMOL/L (ref 98–110)
CHOL/HDL RATIO,CHHDX: 5.2 (CALC)
CHOLEST SERPL-MCNC: 197 MG/DL
CO2 SERPL-SCNC: 28 MMOL/L (ref 20–32)
CREAT SERPL-MCNC: 1.08 MG/DL (ref 0.7–1.25)
CREATININE URINE,9612018: 39 MG/DL (ref 20–320)
ERYTHROCYTE [DISTWIDTH] IN BLOOD BY AUTOMATED COUNT: 12.9 % (ref 11–15)
GLOBULIN,GLOB: 2.6 G/DL (CALC) (ref 1.9–3.7)
GLUCOSE SERPL-MCNC: 141 MG/DL (ref 65–99)
HCT VFR BLD AUTO: 38.6 % (ref 38.5–50)
HDLC SERPL-MCNC: 38 MG/DL
HGB BLD-MCNC: 12.6 G/DL (ref 13.2–17.1)
LDL-CHOLESTEROL: 130 MG/DL (CALC)
MCH RBC QN AUTO: 28.3 PG (ref 27–33)
MCHC RBC AUTO-ENTMCNC: 32.6 G/DL (ref 32–36)
MCV RBC AUTO: 86.7 FL (ref 80–100)
MICROALBUMIN,URINE RANDOM 140054: 1.2 MG/DL
MICROALBUMIN/CREAT RATIO: 31 MCG/MG CREAT
NON-HDL CHOLESTEROL, 011976: 159 MG/DL (CALC)
PLATELET # BLD AUTO: 335 THOUSAND/UL (ref 140–400)
PMV BLD AUTO: 11.6 FL (ref 7.5–12.5)
POTASSIUM SERPL-SCNC: 4.4 MMOL/L (ref 3.5–5.3)
PROT SERPL-MCNC: 6.6 G/DL (ref 6.1–8.1)
RBC # BLD AUTO: 4.45 MILLION/UL (ref 4.2–5.8)
SODIUM SERPL-SCNC: 141 MMOL/L (ref 135–146)
TRIGL SERPL-MCNC: 176 MG/DL (ref ?–150)
WBC # BLD AUTO: 9.2 THOUSAND/UL (ref 3.8–10.8)